# Patient Record
Sex: FEMALE | Race: WHITE
[De-identification: names, ages, dates, MRNs, and addresses within clinical notes are randomized per-mention and may not be internally consistent; named-entity substitution may affect disease eponyms.]

---

## 2017-06-25 ENCOUNTER — HOSPITAL ENCOUNTER (INPATIENT)
Dept: HOSPITAL 8 - LDOP | Age: 22
LOS: 3 days | Discharge: HOME | DRG: 781 | End: 2017-06-28
Attending: OBSTETRICS & GYNECOLOGY | Admitting: OBSTETRICS & GYNECOLOGY
Payer: COMMERCIAL

## 2017-06-25 VITALS — BODY MASS INDEX: 33.71 KG/M2 | HEIGHT: 63 IN | WEIGHT: 190.26 LBS

## 2017-06-25 VITALS — SYSTOLIC BLOOD PRESSURE: 156 MMHG | DIASTOLIC BLOOD PRESSURE: 114 MMHG

## 2017-06-25 DIAGNOSIS — Z67.41: ICD-10-CM

## 2017-06-25 DIAGNOSIS — O26.873: ICD-10-CM

## 2017-06-25 DIAGNOSIS — O26.893: ICD-10-CM

## 2017-06-25 DIAGNOSIS — O99.333: ICD-10-CM

## 2017-06-25 DIAGNOSIS — O99.613: ICD-10-CM

## 2017-06-25 DIAGNOSIS — O14.93: Primary | ICD-10-CM

## 2017-06-25 DIAGNOSIS — F17.210: ICD-10-CM

## 2017-06-25 DIAGNOSIS — K21.9: ICD-10-CM

## 2017-06-25 DIAGNOSIS — Z87.59: ICD-10-CM

## 2017-06-25 LAB — DAU SCREEN: (no result)

## 2017-06-25 PROCEDURE — 36415 COLL VENOUS BLD VENIPUNCTURE: CPT

## 2017-06-25 PROCEDURE — 83735 ASSAY OF MAGNESIUM: CPT

## 2017-06-25 PROCEDURE — 84156 ASSAY OF PROTEIN URINE: CPT

## 2017-06-25 PROCEDURE — 86900 BLOOD TYPING SEROLOGIC ABO: CPT

## 2017-06-25 PROCEDURE — 81001 URINALYSIS AUTO W/SCOPE: CPT

## 2017-06-25 PROCEDURE — S0028 INJECTION, FAMOTIDINE, 20 MG: HCPCS

## 2017-06-25 PROCEDURE — 85025 COMPLETE CBC W/AUTO DIFF WBC: CPT

## 2017-06-25 PROCEDURE — 82248 BILIRUBIN DIRECT: CPT

## 2017-06-25 PROCEDURE — 82570 ASSAY OF URINE CREATININE: CPT

## 2017-06-25 PROCEDURE — 80053 COMPREHEN METABOLIC PANEL: CPT

## 2017-06-25 PROCEDURE — 80307 DRUG TEST PRSMV CHEM ANLYZR: CPT

## 2017-06-25 PROCEDURE — 87081 CULTURE SCREEN ONLY: CPT

## 2017-06-25 PROCEDURE — 86850 RBC ANTIBODY SCREEN: CPT

## 2017-06-25 PROCEDURE — 84550 ASSAY OF BLOOD/URIC ACID: CPT

## 2017-06-25 PROCEDURE — 81050 URINALYSIS VOLUME MEASURE: CPT

## 2017-06-25 RX ADMIN — BETAMETHASONE SODIUM PHOSPHATE AND BETAMETHASONE ACETATE SCH MG: 3; 3 INJECTION, SUSPENSION INTRA-ARTICULAR; INTRALESIONAL; INTRAMUSCULAR at 23:31

## 2017-06-25 RX ADMIN — SODIUM CHLORIDE, SODIUM LACTATE, POTASSIUM CHLORIDE, AND CALCIUM CHLORIDE PRN MLS/HR: .6; .31; .03; .02 INJECTION, SOLUTION INTRAVENOUS at 23:22

## 2017-06-25 RX ADMIN — MAGNESIUM SULFATE HEPTAHYDRATE SCH MLS/HR: 40 INJECTION, SOLUTION INTRAVENOUS at 23:50

## 2017-06-26 VITALS — DIASTOLIC BLOOD PRESSURE: 73 MMHG | SYSTOLIC BLOOD PRESSURE: 122 MMHG

## 2017-06-26 VITALS — SYSTOLIC BLOOD PRESSURE: 131 MMHG | DIASTOLIC BLOOD PRESSURE: 83 MMHG

## 2017-06-26 LAB
AST SERPL-CCNC: 14 U/L (ref 15–37)
BUN SERPL-MCNC: 4 MG/DL (ref 7–18)

## 2017-06-26 RX ADMIN — MAGNESIUM SULFATE HEPTAHYDRATE SCH MLS/HR: 40 INJECTION, SOLUTION INTRAVENOUS at 10:56

## 2017-06-26 RX ADMIN — AMPICILLIN SODIUM SCH MLS/HR: 1 INJECTION, POWDER, FOR SOLUTION INTRAMUSCULAR; INTRAVENOUS at 20:17

## 2017-06-26 RX ADMIN — MAGNESIUM SULFATE HEPTAHYDRATE SCH MLS/HR: 40 INJECTION, SOLUTION INTRAVENOUS at 22:45

## 2017-06-26 RX ADMIN — SODIUM CHLORIDE, SODIUM LACTATE, POTASSIUM CHLORIDE, AND CALCIUM CHLORIDE PRN MLS/HR: .6; .31; .03; .02 INJECTION, SOLUTION INTRAVENOUS at 08:01

## 2017-06-26 RX ADMIN — ACETAMINOPHEN PRN MG: 325 TABLET, FILM COATED ORAL at 22:34

## 2017-06-26 RX ADMIN — ACETAMINOPHEN PRN MG: 325 TABLET, FILM COATED ORAL at 06:56

## 2017-06-26 RX ADMIN — AMPICILLIN SODIUM SCH MLS/HR: 1 INJECTION, POWDER, FOR SOLUTION INTRAMUSCULAR; INTRAVENOUS at 12:00

## 2017-06-26 RX ADMIN — AMPICILLIN SODIUM SCH MLS/HR: 1 INJECTION, POWDER, FOR SOLUTION INTRAMUSCULAR; INTRAVENOUS at 08:07

## 2017-06-26 RX ADMIN — FAMOTIDINE PRN MG: 10 INJECTION INTRAVENOUS at 22:35

## 2017-06-26 RX ADMIN — AMPICILLIN SODIUM SCH MLS/HR: 1 INJECTION, POWDER, FOR SOLUTION INTRAMUSCULAR; INTRAVENOUS at 16:03

## 2017-06-26 RX ADMIN — BETAMETHASONE SODIUM PHOSPHATE AND BETAMETHASONE ACETATE SCH MG: 3; 3 INJECTION, SUSPENSION INTRA-ARTICULAR; INTRALESIONAL; INTRAMUSCULAR at 23:48

## 2017-06-26 RX ADMIN — AMPICILLIN SODIUM SCH MLS/HR: 1 INJECTION, POWDER, FOR SOLUTION INTRAMUSCULAR; INTRAVENOUS at 04:00

## 2017-06-26 RX ADMIN — SODIUM CHLORIDE, SODIUM LACTATE, POTASSIUM CHLORIDE, AND CALCIUM CHLORIDE PRN MLS/HR: .6; .31; .03; .02 INJECTION, SOLUTION INTRAVENOUS at 22:37

## 2017-06-26 RX ADMIN — FAMOTIDINE PRN MG: 10 INJECTION INTRAVENOUS at 00:15

## 2017-06-27 VITALS — SYSTOLIC BLOOD PRESSURE: 127 MMHG | DIASTOLIC BLOOD PRESSURE: 67 MMHG

## 2017-06-27 VITALS — SYSTOLIC BLOOD PRESSURE: 134 MMHG | DIASTOLIC BLOOD PRESSURE: 89 MMHG

## 2017-06-27 LAB
AST SERPL-CCNC: 12 U/L (ref 15–37)
BUN SERPL-MCNC: 5 MG/DL (ref 7–18)

## 2017-06-27 RX ADMIN — AMPICILLIN SODIUM SCH MLS/HR: 1 INJECTION, POWDER, FOR SOLUTION INTRAMUSCULAR; INTRAVENOUS at 04:17

## 2017-06-27 RX ADMIN — AMPICILLIN SODIUM SCH MLS/HR: 1 INJECTION, POWDER, FOR SOLUTION INTRAMUSCULAR; INTRAVENOUS at 11:50

## 2017-06-27 RX ADMIN — FAMOTIDINE PRN MG: 10 INJECTION INTRAVENOUS at 22:27

## 2017-06-27 RX ADMIN — AMPICILLIN SODIUM SCH MLS/HR: 1 INJECTION, POWDER, FOR SOLUTION INTRAMUSCULAR; INTRAVENOUS at 17:00

## 2017-06-27 RX ADMIN — AMPICILLIN SODIUM SCH MLS/HR: 1 INJECTION, POWDER, FOR SOLUTION INTRAMUSCULAR; INTRAVENOUS at 00:47

## 2017-06-27 RX ADMIN — AMPICILLIN SODIUM SCH MLS/HR: 1 INJECTION, POWDER, FOR SOLUTION INTRAMUSCULAR; INTRAVENOUS at 07:42

## 2017-06-28 VITALS — SYSTOLIC BLOOD PRESSURE: 140 MMHG | DIASTOLIC BLOOD PRESSURE: 77 MMHG

## 2017-06-29 ENCOUNTER — HOSPITAL ENCOUNTER (INPATIENT)
Dept: HOSPITAL 8 - LDOP | Age: 22
LOS: 3 days | Discharge: HOME | End: 2017-07-02
Attending: OBSTETRICS & GYNECOLOGY | Admitting: OBSTETRICS & GYNECOLOGY
Payer: COMMERCIAL

## 2017-06-29 VITALS — BODY MASS INDEX: 23.98 KG/M2 | HEIGHT: 63 IN | WEIGHT: 135.36 LBS

## 2017-06-29 VITALS — DIASTOLIC BLOOD PRESSURE: 75 MMHG | SYSTOLIC BLOOD PRESSURE: 142 MMHG

## 2017-06-29 DIAGNOSIS — O26.893: ICD-10-CM

## 2017-06-29 DIAGNOSIS — O26.899: ICD-10-CM

## 2017-06-29 DIAGNOSIS — Z79.899: ICD-10-CM

## 2017-06-29 DIAGNOSIS — Z23: ICD-10-CM

## 2017-06-29 DIAGNOSIS — D64.9: ICD-10-CM

## 2017-06-29 DIAGNOSIS — K21.9: ICD-10-CM

## 2017-06-29 DIAGNOSIS — O26.873: ICD-10-CM

## 2017-06-29 DIAGNOSIS — Z67.11: ICD-10-CM

## 2017-06-29 DIAGNOSIS — Z87.59: ICD-10-CM

## 2017-06-29 DIAGNOSIS — Z3A.36: ICD-10-CM

## 2017-06-29 DIAGNOSIS — F17.210: ICD-10-CM

## 2017-06-29 DIAGNOSIS — Z87.440: ICD-10-CM

## 2017-06-29 LAB
AST SERPL-CCNC: 47 U/L (ref 15–37)
BUN SERPL-MCNC: 7 MG/DL (ref 7–18)
DAU SCREEN: (no result)

## 2017-06-29 PROCEDURE — 00HU33Z INSERTION OF INFUSION DEVICE INTO SPINAL CANAL, PERCUTANEOUS APPROACH: ICD-10-PCS | Performed by: OBSTETRICS & GYNECOLOGY

## 2017-06-29 PROCEDURE — 3E0R3CZ: ICD-10-PCS | Performed by: OBSTETRICS & GYNECOLOGY

## 2017-06-29 PROCEDURE — 81001 URINALYSIS AUTO W/SCOPE: CPT

## 2017-06-29 PROCEDURE — 87086 URINE CULTURE/COLONY COUNT: CPT

## 2017-06-29 PROCEDURE — 85025 COMPLETE CBC W/AUTO DIFF WBC: CPT

## 2017-06-29 PROCEDURE — 85461 HEMOGLOBIN FETAL: CPT

## 2017-06-29 PROCEDURE — 90715 TDAP VACCINE 7 YRS/> IM: CPT

## 2017-06-29 PROCEDURE — 84156 ASSAY OF PROTEIN URINE: CPT

## 2017-06-29 PROCEDURE — 80053 COMPREHEN METABOLIC PANEL: CPT

## 2017-06-29 PROCEDURE — 83735 ASSAY OF MAGNESIUM: CPT

## 2017-06-29 PROCEDURE — 10907ZC DRAINAGE OF AMNIOTIC FLUID, THERAPEUTIC FROM PRODUCTS OF CONCEPTION, VIA NATURAL OR ARTIFICIAL OPENING: ICD-10-PCS | Performed by: OBSTETRICS & GYNECOLOGY

## 2017-06-29 PROCEDURE — 36415 COLL VENOUS BLD VENIPUNCTURE: CPT

## 2017-06-29 PROCEDURE — 86900 BLOOD TYPING SEROLOGIC ABO: CPT

## 2017-06-29 PROCEDURE — 82570 ASSAY OF URINE CREATININE: CPT

## 2017-06-29 PROCEDURE — 84550 ASSAY OF BLOOD/URIC ACID: CPT

## 2017-06-29 PROCEDURE — 82248 BILIRUBIN DIRECT: CPT

## 2017-06-29 PROCEDURE — 0HQ9XZZ REPAIR PERINEUM SKIN, EXTERNAL APPROACH: ICD-10-PCS | Performed by: OBSTETRICS & GYNECOLOGY

## 2017-06-29 PROCEDURE — 80307 DRUG TEST PRSMV CHEM ANLYZR: CPT

## 2017-06-29 PROCEDURE — 86850 RBC ANTIBODY SCREEN: CPT

## 2017-06-29 RX ADMIN — SODIUM CHLORIDE, SODIUM LACTATE, POTASSIUM CHLORIDE, AND CALCIUM CHLORIDE SCH MLS/HR: .6; .31; .03; .02 INJECTION, SOLUTION INTRAVENOUS at 17:02

## 2017-06-29 RX ADMIN — SODIUM CHLORIDE, SODIUM LACTATE, POTASSIUM CHLORIDE, CALCIUM CHLORIDE, AND DEXTROSE MONOHYDRATE SCH MLS/HR: 600; 310; 30; 20; 5 INJECTION, SOLUTION INTRAVENOUS at 19:49

## 2017-06-29 RX ADMIN — MAGNESIUM SULFATE HEPTAHYDRATE SCH MLS/HR: 40 INJECTION, SOLUTION INTRAVENOUS at 13:15

## 2017-06-29 RX ADMIN — SODIUM CHLORIDE, SODIUM LACTATE, POTASSIUM CHLORIDE, AND CALCIUM CHLORIDE SCH MLS/HR: .6; .31; .03; .02 INJECTION, SOLUTION INTRAVENOUS at 12:27

## 2017-06-29 RX ADMIN — SODIUM CHLORIDE, SODIUM LACTATE, POTASSIUM CHLORIDE, CALCIUM CHLORIDE, AND DEXTROSE MONOHYDRATE SCH MLS/HR: 600; 310; 30; 20; 5 INJECTION, SOLUTION INTRAVENOUS at 11:49

## 2017-06-29 RX ADMIN — SODIUM CHLORIDE, SODIUM LACTATE, POTASSIUM CHLORIDE, AND CALCIUM CHLORIDE SCH MLS/HR: .6; .31; .03; .02 INJECTION, SOLUTION INTRAVENOUS at 16:48

## 2017-06-29 RX ADMIN — MAGNESIUM SULFATE HEPTAHYDRATE SCH MLS/HR: 40 INJECTION, SOLUTION INTRAVENOUS at 23:40

## 2017-06-29 RX ADMIN — AMPICILLIN SODIUM SCH MLS/HR: 1 INJECTION, POWDER, FOR SOLUTION INTRAMUSCULAR; INTRAVENOUS at 16:00

## 2017-06-29 RX ADMIN — AMPICILLIN SODIUM SCH MLS/HR: 1 INJECTION, POWDER, FOR SOLUTION INTRAMUSCULAR; INTRAVENOUS at 20:00

## 2017-06-30 LAB
AST SERPL-CCNC: 35 U/L (ref 15–37)
BUN SERPL-MCNC: 9 MG/DL (ref 7–18)

## 2017-06-30 RX ADMIN — AMPICILLIN SODIUM SCH MLS/HR: 1 INJECTION, POWDER, FOR SOLUTION INTRAMUSCULAR; INTRAVENOUS at 08:00

## 2017-06-30 RX ADMIN — HYDROCODONE BITARTRATE AND ACETAMINOPHEN PRN TAB: 5; 325 TABLET ORAL at 23:48

## 2017-06-30 RX ADMIN — SODIUM CHLORIDE, SODIUM LACTATE, POTASSIUM CHLORIDE, CALCIUM CHLORIDE, AND DEXTROSE MONOHYDRATE SCH MLS/HR: 600; 310; 30; 20; 5 INJECTION, SOLUTION INTRAVENOUS at 03:49

## 2017-06-30 RX ADMIN — AMPICILLIN SODIUM SCH MLS/HR: 1 INJECTION, POWDER, FOR SOLUTION INTRAMUSCULAR; INTRAVENOUS at 12:00

## 2017-06-30 RX ADMIN — LABETALOL HYDROCHLORIDE PRN MG: 5 INJECTION INTRAVENOUS at 06:24

## 2017-06-30 RX ADMIN — SODIUM CHLORIDE, SODIUM LACTATE, POTASSIUM CHLORIDE, CALCIUM CHLORIDE, AND DEXTROSE MONOHYDRATE SCH MLS/HR: 600; 310; 30; 20; 5 INJECTION, SOLUTION INTRAVENOUS at 19:49

## 2017-06-30 RX ADMIN — MAGNESIUM SULFATE HEPTAHYDRATE SCH MLS/HR: 40 INJECTION, SOLUTION INTRAVENOUS at 19:05

## 2017-06-30 RX ADMIN — SODIUM CHLORIDE, SODIUM LACTATE, POTASSIUM CHLORIDE, CALCIUM CHLORIDE, AND DEXTROSE MONOHYDRATE SCH MLS/HR: 600; 310; 30; 20; 5 INJECTION, SOLUTION INTRAVENOUS at 11:49

## 2017-06-30 RX ADMIN — SODIUM CHLORIDE, SODIUM LACTATE, POTASSIUM CHLORIDE, AND CALCIUM CHLORIDE SCH MLS/HR: .6; .31; .03; .02 INJECTION, SOLUTION INTRAVENOUS at 11:49

## 2017-06-30 RX ADMIN — AMPICILLIN SODIUM SCH MLS/HR: 1 INJECTION, POWDER, FOR SOLUTION INTRAMUSCULAR; INTRAVENOUS at 00:00

## 2017-06-30 RX ADMIN — LABETALOL HYDROCHLORIDE SCH MG: 200 TABLET, FILM COATED ORAL at 10:52

## 2017-06-30 RX ADMIN — Medication SCH MLS/HR: at 06:08

## 2017-06-30 RX ADMIN — SODIUM CHLORIDE, SODIUM LACTATE, POTASSIUM CHLORIDE, AND CALCIUM CHLORIDE SCH MLS/HR: .6; .31; .03; .02 INJECTION, SOLUTION INTRAVENOUS at 17:02

## 2017-06-30 RX ADMIN — LABETALOL HYDROCHLORIDE SCH MG: 200 TABLET, FILM COATED ORAL at 19:02

## 2017-06-30 RX ADMIN — SODIUM CHLORIDE, SODIUM LACTATE, POTASSIUM CHLORIDE, AND CALCIUM CHLORIDE SCH MLS/HR: .6; .31; .03; .02 INJECTION, SOLUTION INTRAVENOUS at 09:02

## 2017-06-30 RX ADMIN — LABETALOL HYDROCHLORIDE PRN MG: 5 INJECTION INTRAVENOUS at 04:41

## 2017-06-30 RX ADMIN — SODIUM CHLORIDE, SODIUM LACTATE, POTASSIUM CHLORIDE, AND CALCIUM CHLORIDE SCH MLS/HR: .6; .31; .03; .02 INJECTION, SOLUTION INTRAVENOUS at 03:40

## 2017-06-30 RX ADMIN — AMPICILLIN SODIUM SCH MLS/HR: 1 INJECTION, POWDER, FOR SOLUTION INTRAMUSCULAR; INTRAVENOUS at 20:00

## 2017-06-30 RX ADMIN — SODIUM CHLORIDE, SODIUM LACTATE, POTASSIUM CHLORIDE, AND CALCIUM CHLORIDE SCH MLS/HR: .6; .31; .03; .02 INJECTION, SOLUTION INTRAVENOUS at 19:49

## 2017-06-30 RX ADMIN — AMPICILLIN SODIUM SCH MLS/HR: 1 INJECTION, POWDER, FOR SOLUTION INTRAMUSCULAR; INTRAVENOUS at 04:00

## 2017-06-30 RX ADMIN — LABETALOL HYDROCHLORIDE PRN MG: 5 INJECTION INTRAVENOUS at 08:31

## 2017-06-30 RX ADMIN — SODIUM CHLORIDE, SODIUM LACTATE, POTASSIUM CHLORIDE, AND CALCIUM CHLORIDE SCH MLS/HR: .6; .31; .03; .02 INJECTION, SOLUTION INTRAVENOUS at 01:02

## 2017-06-30 RX ADMIN — AMPICILLIN SODIUM SCH MLS/HR: 1 INJECTION, POWDER, FOR SOLUTION INTRAMUSCULAR; INTRAVENOUS at 16:00

## 2017-06-30 RX ADMIN — HYDROCODONE BITARTRATE AND ACETAMINOPHEN PRN TAB: 5; 325 TABLET ORAL at 19:16

## 2017-06-30 RX ADMIN — Medication SCH MLS/HR: at 16:08

## 2017-06-30 RX ADMIN — HYDROCODONE BITARTRATE AND ACETAMINOPHEN PRN TAB: 5; 325 TABLET ORAL at 15:02

## 2017-06-30 RX ADMIN — HYDROCODONE BITARTRATE AND ACETAMINOPHEN PRN TAB: 5; 325 TABLET ORAL at 10:52

## 2017-06-30 RX ADMIN — MAGNESIUM SULFATE HEPTAHYDRATE SCH MLS/HR: 40 INJECTION, SOLUTION INTRAVENOUS at 09:04

## 2017-07-01 VITALS — SYSTOLIC BLOOD PRESSURE: 154 MMHG | DIASTOLIC BLOOD PRESSURE: 96 MMHG

## 2017-07-01 VITALS — SYSTOLIC BLOOD PRESSURE: 142 MMHG | DIASTOLIC BLOOD PRESSURE: 96 MMHG

## 2017-07-01 VITALS — SYSTOLIC BLOOD PRESSURE: 157 MMHG | DIASTOLIC BLOOD PRESSURE: 117 MMHG

## 2017-07-01 VITALS — SYSTOLIC BLOOD PRESSURE: 146 MMHG | DIASTOLIC BLOOD PRESSURE: 102 MMHG

## 2017-07-01 VITALS — DIASTOLIC BLOOD PRESSURE: 98 MMHG | SYSTOLIC BLOOD PRESSURE: 153 MMHG

## 2017-07-01 VITALS — DIASTOLIC BLOOD PRESSURE: 109 MMHG | SYSTOLIC BLOOD PRESSURE: 163 MMHG

## 2017-07-01 PROCEDURE — 3E0334Z INTRODUCTION OF SERUM, TOXOID AND VACCINE INTO PERIPHERAL VEIN, PERCUTANEOUS APPROACH: ICD-10-PCS | Performed by: OBSTETRICS & GYNECOLOGY

## 2017-07-01 RX ADMIN — MAGNESIUM SULFATE HEPTAHYDRATE SCH MLS/HR: 40 INJECTION, SOLUTION INTRAVENOUS at 04:42

## 2017-07-01 RX ADMIN — AMPICILLIN SODIUM SCH MLS/HR: 1 INJECTION, POWDER, FOR SOLUTION INTRAMUSCULAR; INTRAVENOUS at 04:00

## 2017-07-01 RX ADMIN — FERROUS SULFATE TAB 325 MG (65 MG ELEMENTAL FE) SCH MG: 325 (65 FE) TAB at 08:37

## 2017-07-01 RX ADMIN — DOCUSATE SODIUM SCH MG: 100 CAPSULE, LIQUID FILLED ORAL at 20:51

## 2017-07-01 RX ADMIN — PRENATAL VIT W/ FE FUMARATE-FA TAB 27-0.8 MG SCH EACH: 27-0.8 TAB at 08:37

## 2017-07-01 RX ADMIN — SODIUM CHLORIDE, SODIUM LACTATE, POTASSIUM CHLORIDE, CALCIUM CHLORIDE, AND DEXTROSE MONOHYDRATE SCH MLS/HR: 600; 310; 30; 20; 5 INJECTION, SOLUTION INTRAVENOUS at 03:49

## 2017-07-01 RX ADMIN — AMPICILLIN SODIUM SCH MLS/HR: 1 INJECTION, POWDER, FOR SOLUTION INTRAMUSCULAR; INTRAVENOUS at 00:00

## 2017-07-01 RX ADMIN — HYDROCODONE BITARTRATE AND ACETAMINOPHEN PRN TAB: 5; 325 TABLET ORAL at 13:09

## 2017-07-01 RX ADMIN — SODIUM CHLORIDE, SODIUM LACTATE, POTASSIUM CHLORIDE, AND CALCIUM CHLORIDE SCH MLS/HR: .6; .31; .03; .02 INJECTION, SOLUTION INTRAVENOUS at 03:49

## 2017-07-01 RX ADMIN — SODIUM CHLORIDE, SODIUM LACTATE, POTASSIUM CHLORIDE, AND CALCIUM CHLORIDE SCH MLS/HR: .6; .31; .03; .02 INJECTION, SOLUTION INTRAVENOUS at 01:02

## 2017-07-01 RX ADMIN — HYDROCODONE BITARTRATE AND ACETAMINOPHEN PRN TAB: 5; 325 TABLET ORAL at 08:36

## 2017-07-01 RX ADMIN — LABETALOL HYDROCHLORIDE SCH MG: 200 TABLET, FILM COATED ORAL at 16:28

## 2017-07-01 RX ADMIN — FERROUS SULFATE TAB 325 MG (65 MG ELEMENTAL FE) SCH MG: 325 (65 FE) TAB at 16:28

## 2017-07-01 RX ADMIN — LABETALOL HYDROCHLORIDE SCH MG: 200 TABLET, FILM COATED ORAL at 20:51

## 2017-07-01 RX ADMIN — LABETALOL HYDROCHLORIDE SCH MG: 200 TABLET, FILM COATED ORAL at 08:36

## 2017-07-01 RX ADMIN — PRENATAL VIT W/ FE FUMARATE-FA TAB 27-0.8 MG SCH EACH: 27-0.8 TAB at 09:00

## 2017-07-01 RX ADMIN — HYDROCODONE BITARTRATE AND ACETAMINOPHEN PRN TAB: 5; 325 TABLET ORAL at 19:31

## 2017-07-01 RX ADMIN — Medication SCH MLS/HR: at 02:08

## 2017-07-02 VITALS — DIASTOLIC BLOOD PRESSURE: 77 MMHG | SYSTOLIC BLOOD PRESSURE: 156 MMHG

## 2017-07-02 VITALS — DIASTOLIC BLOOD PRESSURE: 93 MMHG | SYSTOLIC BLOOD PRESSURE: 135 MMHG

## 2017-07-02 VITALS — DIASTOLIC BLOOD PRESSURE: 103 MMHG | SYSTOLIC BLOOD PRESSURE: 164 MMHG

## 2017-07-02 VITALS — DIASTOLIC BLOOD PRESSURE: 90 MMHG | SYSTOLIC BLOOD PRESSURE: 155 MMHG

## 2017-07-02 VITALS — DIASTOLIC BLOOD PRESSURE: 102 MMHG | SYSTOLIC BLOOD PRESSURE: 149 MMHG

## 2017-07-02 VITALS — DIASTOLIC BLOOD PRESSURE: 78 MMHG | SYSTOLIC BLOOD PRESSURE: 133 MMHG

## 2017-07-02 VITALS — SYSTOLIC BLOOD PRESSURE: 159 MMHG | DIASTOLIC BLOOD PRESSURE: 107 MMHG

## 2017-07-02 RX ADMIN — FERROUS SULFATE TAB 325 MG (65 MG ELEMENTAL FE) SCH MG: 325 (65 FE) TAB at 09:21

## 2017-07-02 RX ADMIN — LABETALOL HYDROCHLORIDE SCH MG: 200 TABLET, FILM COATED ORAL at 15:36

## 2017-07-02 RX ADMIN — PRENATAL VIT W/ FE FUMARATE-FA TAB 27-0.8 MG SCH EACH: 27-0.8 TAB at 09:21

## 2017-07-02 RX ADMIN — LABETALOL HYDROCHLORIDE SCH MG: 200 TABLET, FILM COATED ORAL at 04:46

## 2017-07-02 RX ADMIN — HYDROCODONE BITARTRATE AND ACETAMINOPHEN PRN TAB: 5; 325 TABLET ORAL at 04:28

## 2017-07-02 RX ADMIN — DOCUSATE SODIUM SCH MG: 100 CAPSULE, LIQUID FILLED ORAL at 09:21

## 2019-12-20 ENCOUNTER — HOSPITAL ENCOUNTER (INPATIENT)
Facility: MEDICAL CENTER | Age: 24
LOS: 2 days | DRG: 831 | End: 2019-12-22
Attending: EMERGENCY MEDICINE | Admitting: INTERNAL MEDICINE

## 2019-12-20 ENCOUNTER — INITIAL PRENATAL (OUTPATIENT)
Dept: OBGYN | Facility: CLINIC | Age: 24
End: 2019-12-20

## 2019-12-20 VITALS
BODY MASS INDEX: 21.74 KG/M2 | SYSTOLIC BLOOD PRESSURE: 146 MMHG | WEIGHT: 122.7 LBS | HEIGHT: 63 IN | DIASTOLIC BLOOD PRESSURE: 86 MMHG

## 2019-12-20 DIAGNOSIS — R30.0 DYSURIA: ICD-10-CM

## 2019-12-20 DIAGNOSIS — N12 PYELONEPHRITIS: ICD-10-CM

## 2019-12-20 DIAGNOSIS — Z3A.12 12 WEEKS GESTATION OF PREGNANCY: ICD-10-CM

## 2019-12-20 DIAGNOSIS — Z32.01 PREGNANCY EXAMINATION OR TEST, POSITIVE RESULT: ICD-10-CM

## 2019-12-20 DIAGNOSIS — F15.10 METHAMPHETAMINE ABUSE (HCC): ICD-10-CM

## 2019-12-20 PROBLEM — N10 ACUTE PYELONEPHRITIS: Status: ACTIVE | Noted: 2019-12-20

## 2019-12-20 PROBLEM — A41.9 SEPSIS (HCC): Status: ACTIVE | Noted: 2019-12-20

## 2019-12-20 PROBLEM — D72.829 LEUKOCYTOSIS: Status: ACTIVE | Noted: 2019-12-20

## 2019-12-20 LAB
ALBUMIN SERPL BCP-MCNC: 4 G/DL (ref 3.2–4.9)
ALBUMIN/GLOB SERPL: 1.4 G/DL
ALP SERPL-CCNC: 63 U/L (ref 30–99)
ALT SERPL-CCNC: 9 U/L (ref 2–50)
AMPHET UR QL SCN: POSITIVE
ANION GAP SERPL CALC-SCNC: 9 MMOL/L (ref 0–11.9)
APPEARANCE UR: ABNORMAL
APPEARANCE UR: NORMAL
AST SERPL-CCNC: 10 U/L (ref 12–45)
B-HCG SERPL-ACNC: ABNORMAL MIU/ML (ref 0–5)
BACTERIA #/AREA URNS HPF: ABNORMAL /HPF
BARBITURATES UR QL SCN: NEGATIVE
BASOPHILS # BLD AUTO: 0.2 % (ref 0–1.8)
BASOPHILS # BLD: 0.03 K/UL (ref 0–0.12)
BENZODIAZ UR QL SCN: NEGATIVE
BILIRUB SERPL-MCNC: 0.7 MG/DL (ref 0.1–1.5)
BILIRUB UR QL STRIP.AUTO: NEGATIVE
BILIRUB UR STRIP-MCNC: NORMAL MG/DL
BUN SERPL-MCNC: 7 MG/DL (ref 8–22)
BZE UR QL SCN: NEGATIVE
CALCIUM SERPL-MCNC: 9.1 MG/DL (ref 8.5–10.5)
CANNABINOIDS UR QL SCN: NEGATIVE
CHLORIDE SERPL-SCNC: 105 MMOL/L (ref 96–112)
CO2 SERPL-SCNC: 20 MMOL/L (ref 20–33)
COLOR UR AUTO: NORMAL
COLOR UR: YELLOW
CREAT SERPL-MCNC: 0.58 MG/DL (ref 0.5–1.4)
EOSINOPHIL # BLD AUTO: 0.01 K/UL (ref 0–0.51)
EOSINOPHIL NFR BLD: 0.1 % (ref 0–6.9)
EPI CELLS #/AREA URNS HPF: ABNORMAL /HPF
ERYTHROCYTE [DISTWIDTH] IN BLOOD BY AUTOMATED COUNT: 40.3 FL (ref 35.9–50)
GLOBULIN SER CALC-MCNC: 2.9 G/DL (ref 1.9–3.5)
GLUCOSE SERPL-MCNC: 104 MG/DL (ref 65–99)
GLUCOSE UR STRIP.AUTO-MCNC: NEGATIVE MG/DL
GLUCOSE UR STRIP.AUTO-MCNC: NEGATIVE MG/DL
HCT VFR BLD AUTO: 40.7 % (ref 37–47)
HGB BLD-MCNC: 14.5 G/DL (ref 12–16)
HYALINE CASTS #/AREA URNS LPF: ABNORMAL /LPF
IMM GRANULOCYTES # BLD AUTO: 0.12 K/UL (ref 0–0.11)
IMM GRANULOCYTES NFR BLD AUTO: 0.6 % (ref 0–0.9)
INR PPP: 1 (ref 0.87–1.13)
INT CON NEG: NEGATIVE
INT CON POS: POSITIVE
KETONES UR STRIP.AUTO-MCNC: NEGATIVE MG/DL
KETONES UR STRIP.AUTO-MCNC: NEGATIVE MG/DL
LACTATE BLD-SCNC: 1.6 MMOL/L (ref 0.5–2)
LEUKOCYTE ESTERASE UR QL STRIP.AUTO: ABNORMAL
LEUKOCYTE ESTERASE UR QL STRIP.AUTO: NORMAL
LIPASE SERPL-CCNC: 7 U/L (ref 11–82)
LYMPHOCYTES # BLD AUTO: 0.89 K/UL (ref 1–4.8)
LYMPHOCYTES NFR BLD: 4.7 % (ref 22–41)
MCH RBC QN AUTO: 33.1 PG (ref 27–33)
MCHC RBC AUTO-ENTMCNC: 35.6 G/DL (ref 33.6–35)
MCV RBC AUTO: 92.9 FL (ref 81.4–97.8)
METHADONE UR QL SCN: NEGATIVE
MICRO URNS: ABNORMAL
MONOCYTES # BLD AUTO: 0.58 K/UL (ref 0–0.85)
MONOCYTES NFR BLD AUTO: 3 % (ref 0–13.4)
NEUTROPHILS # BLD AUTO: 17.42 K/UL (ref 2–7.15)
NEUTROPHILS NFR BLD: 91.4 % (ref 44–72)
NITRITE UR QL STRIP.AUTO: POSITIVE
NITRITE UR QL STRIP.AUTO: POSITIVE
NRBC # BLD AUTO: 0 K/UL
NRBC BLD-RTO: 0 /100 WBC
OPIATES UR QL SCN: NEGATIVE
OXYCODONE UR QL SCN: NEGATIVE
PCP UR QL SCN: NEGATIVE
PH UR STRIP.AUTO: 7 [PH] (ref 5–8)
PH UR STRIP.AUTO: 8 [PH] (ref 5–8)
PLATELET # BLD AUTO: 313 K/UL (ref 164–446)
PMV BLD AUTO: 8.8 FL (ref 9–12.9)
POC URINE PREGNANCY TEST: POSITIVE
POTASSIUM SERPL-SCNC: 3.7 MMOL/L (ref 3.6–5.5)
PROPOXYPH UR QL SCN: NEGATIVE
PROT SERPL-MCNC: 6.9 G/DL (ref 6–8.2)
PROT UR QL STRIP: 100 MG/DL
PROT UR QL STRIP: NORMAL MG/DL
PROTHROMBIN TIME: 13.4 SEC (ref 12–14.6)
RBC # BLD AUTO: 4.38 M/UL (ref 4.2–5.4)
RBC # URNS HPF: ABNORMAL /HPF
RBC UR QL AUTO: ABNORMAL
RBC UR QL AUTO: NORMAL
SODIUM SERPL-SCNC: 134 MMOL/L (ref 135–145)
SP GR UR STRIP.AUTO: 1.02
SP GR UR STRIP.AUTO: 1.02
UROBILINOGEN UR STRIP-MCNC: NORMAL MG/DL
UROBILINOGEN UR STRIP.AUTO-MCNC: 1 MG/DL
WBC # BLD AUTO: 19.1 K/UL (ref 4.8–10.8)
WBC #/AREA URNS HPF: ABNORMAL /HPF

## 2019-12-20 PROCEDURE — 81002 URINALYSIS NONAUTO W/O SCOPE: CPT | Performed by: OBSTETRICS & GYNECOLOGY

## 2019-12-20 PROCEDURE — 700111 HCHG RX REV CODE 636 W/ 250 OVERRIDE (IP): Performed by: EMERGENCY MEDICINE

## 2019-12-20 PROCEDURE — 700105 HCHG RX REV CODE 258: Performed by: INTERNAL MEDICINE

## 2019-12-20 PROCEDURE — 99223 1ST HOSP IP/OBS HIGH 75: CPT | Performed by: INTERNAL MEDICINE

## 2019-12-20 PROCEDURE — 700105 HCHG RX REV CODE 258: Performed by: EMERGENCY MEDICINE

## 2019-12-20 PROCEDURE — 80053 COMPREHEN METABOLIC PANEL: CPT

## 2019-12-20 PROCEDURE — 83690 ASSAY OF LIPASE: CPT

## 2019-12-20 PROCEDURE — 81001 URINALYSIS AUTO W/SCOPE: CPT

## 2019-12-20 PROCEDURE — 700102 HCHG RX REV CODE 250 W/ 637 OVERRIDE(OP): Performed by: INTERNAL MEDICINE

## 2019-12-20 PROCEDURE — 87040 BLOOD CULTURE FOR BACTERIA: CPT

## 2019-12-20 PROCEDURE — 99202 OFFICE O/P NEW SF 15 MIN: CPT | Mod: 25 | Performed by: OBSTETRICS & GYNECOLOGY

## 2019-12-20 PROCEDURE — 76830 TRANSVAGINAL US NON-OB: CPT | Performed by: OBSTETRICS & GYNECOLOGY

## 2019-12-20 PROCEDURE — 770020 HCHG ROOM/CARE - TELE (206)

## 2019-12-20 PROCEDURE — 87086 URINE CULTURE/COLONY COUNT: CPT

## 2019-12-20 PROCEDURE — A9270 NON-COVERED ITEM OR SERVICE: HCPCS | Performed by: INTERNAL MEDICINE

## 2019-12-20 PROCEDURE — 87077 CULTURE AEROBIC IDENTIFY: CPT

## 2019-12-20 PROCEDURE — 84702 CHORIONIC GONADOTROPIN TEST: CPT

## 2019-12-20 PROCEDURE — 83605 ASSAY OF LACTIC ACID: CPT

## 2019-12-20 PROCEDURE — 80307 DRUG TEST PRSMV CHEM ANLYZR: CPT

## 2019-12-20 PROCEDURE — 99285 EMERGENCY DEPT VISIT HI MDM: CPT

## 2019-12-20 PROCEDURE — 36415 COLL VENOUS BLD VENIPUNCTURE: CPT

## 2019-12-20 PROCEDURE — 85610 PROTHROMBIN TIME: CPT

## 2019-12-20 PROCEDURE — 96365 THER/PROPH/DIAG IV INF INIT: CPT

## 2019-12-20 PROCEDURE — 87186 SC STD MICRODIL/AGAR DIL: CPT

## 2019-12-20 PROCEDURE — 81025 URINE PREGNANCY TEST: CPT | Performed by: OBSTETRICS & GYNECOLOGY

## 2019-12-20 PROCEDURE — 85025 COMPLETE CBC W/AUTO DIFF WBC: CPT

## 2019-12-20 RX ORDER — AMOXICILLIN 250 MG
2 CAPSULE ORAL 2 TIMES DAILY
Status: DISCONTINUED | OUTPATIENT
Start: 2019-12-20 | End: 2019-12-22 | Stop reason: HOSPADM

## 2019-12-20 RX ORDER — ACETAMINOPHEN 325 MG/1
650 TABLET ORAL EVERY 6 HOURS PRN
Status: DISCONTINUED | OUTPATIENT
Start: 2019-12-20 | End: 2019-12-22 | Stop reason: HOSPADM

## 2019-12-20 RX ORDER — POLYETHYLENE GLYCOL 3350 17 G/17G
1 POWDER, FOR SOLUTION ORAL
Status: DISCONTINUED | OUTPATIENT
Start: 2019-12-20 | End: 2019-12-20

## 2019-12-20 RX ORDER — SODIUM CHLORIDE, SODIUM LACTATE, POTASSIUM CHLORIDE, AND CALCIUM CHLORIDE .6; .31; .03; .02 G/100ML; G/100ML; G/100ML; G/100ML
30 INJECTION, SOLUTION INTRAVENOUS
Status: DISCONTINUED | OUTPATIENT
Start: 2019-12-20 | End: 2019-12-22 | Stop reason: HOSPADM

## 2019-12-20 RX ORDER — BISACODYL 10 MG
10 SUPPOSITORY, RECTAL RECTAL
Status: DISCONTINUED | OUTPATIENT
Start: 2019-12-20 | End: 2019-12-22 | Stop reason: HOSPADM

## 2019-12-20 RX ORDER — SODIUM CHLORIDE, SODIUM LACTATE, POTASSIUM CHLORIDE, CALCIUM CHLORIDE 600; 310; 30; 20 MG/100ML; MG/100ML; MG/100ML; MG/100ML
INJECTION, SOLUTION INTRAVENOUS CONTINUOUS
Status: DISCONTINUED | OUTPATIENT
Start: 2019-12-20 | End: 2019-12-22 | Stop reason: HOSPADM

## 2019-12-20 RX ORDER — SODIUM CHLORIDE, SODIUM LACTATE, POTASSIUM CHLORIDE, AND CALCIUM CHLORIDE .6; .31; .03; .02 G/100ML; G/100ML; G/100ML; G/100ML
1000 INJECTION, SOLUTION INTRAVENOUS
Status: COMPLETED | OUTPATIENT
Start: 2019-12-20 | End: 2019-12-20

## 2019-12-20 RX ADMIN — SODIUM CHLORIDE, POTASSIUM CHLORIDE, SODIUM LACTATE AND CALCIUM CHLORIDE: 600; 310; 30; 20 INJECTION, SOLUTION INTRAVENOUS at 18:00

## 2019-12-20 RX ADMIN — SODIUM CHLORIDE, POTASSIUM CHLORIDE, SODIUM LACTATE AND CALCIUM CHLORIDE 1000 ML: 600; 310; 30; 20 INJECTION, SOLUTION INTRAVENOUS at 16:57

## 2019-12-20 RX ADMIN — ACETAMINOPHEN 650 MG: 325 TABLET, FILM COATED ORAL at 22:26

## 2019-12-20 RX ADMIN — CEFTRIAXONE SODIUM 1 G: 1 INJECTION, POWDER, FOR SOLUTION INTRAMUSCULAR; INTRAVENOUS at 16:41

## 2019-12-20 ASSESSMENT — ENCOUNTER SYMPTOMS
VOMITING: 0
FEVER: 1
FLANK PAIN: 1
EYE DISCHARGE: 0
ORTHOPNEA: 0
NECK PAIN: 0
CHILLS: 1
MYALGIAS: 0
ABDOMINAL PAIN: 0
EYE PAIN: 0
DIARRHEA: 0
EYE REDNESS: 0
SPUTUM PRODUCTION: 0
NAUSEA: 0
HEADACHES: 0
COUGH: 0
STRIDOR: 0
WEIGHT LOSS: 0
BLURRED VISION: 0
FOCAL WEAKNESS: 0
INSOMNIA: 0
HEARTBURN: 0
DIZZINESS: 0
BACK PAIN: 0
SHORTNESS OF BREATH: 0
PALPITATIONS: 0
DEPRESSION: 0
NERVOUS/ANXIOUS: 0
SEIZURES: 0

## 2019-12-20 ASSESSMENT — PAIN SCALES - WONG BAKER: WONGBAKER_NUMERICALRESPONSE: HURTS A WHOLE LOT

## 2019-12-20 NOTE — PROGRESS NOTES
"Stephanie Hoffmann,  24 y.o.  female presents today with a C/O of :oligomenorrhea. Pt   No LMP recorded. Patient is pregnant.       Subjective : Nausea/Vomiting: Yes:  Abdominal /pelvic cramping : Yes :   vaginal bleeding: pinkish , with vcoiding . Also , with back pain , urinary frequency, dribbling , and dysuria       Menstrual Flow : heavy   GYN ROS:  normal menses, no abnormal bleeding, pelvic pain or discharge, no breast pain or new or enlarging lumps on self exam      Past Medical History:   Diagnosis Date   • Anemia    • Anxiety    • Head ache    • Hypertension     PIH   • Substance abuse (HCC) 2018    Patient states last used 3 days ago.    • Urinary tract infection        History reviewed. No pertinent surgical history.    Current Birth control:  none    OB History    Para Term  AB Living   4 1 1   2 1   SAB TAB Ectopic Molar Multiple Live Births             1      # Outcome Date GA Lbr Allan/2nd Weight Sex Delivery Anes PTL Lv   4 Current            3 Term 17 39w0d  2.892 kg (6 lb 6 oz) M Vag-Spont EPI N VERONICA      Birth Comments: Patient states had PIH   2 AB               Birth Comments: Pt states no complications with procedure   1 AB               Birth Comments: Pt states no complications.            Allergy:      Patient has no known allergies.    Exam;    /86   Ht 1.6 m (5' 3\")   Wt 55.7 kg (122 lb 11.2 oz)   BMI 21.74 kg/m²   T- 100.0 degrees   well-hydrated, thin, moderately ill-appearing  normal; Back:  +2 R-CVA tenderness   PERRLA, EOMI, fundi grossly normal, no papilledema, no AV nicking, sclera clear  Clear to auscultation  RRR No M  abdomen is soft without significant tenderness, masses, organomegaly or guarding  External genitalia normal, Vagina normal without dischargeLab.    No results found for this or any previous visit (from the past 336 hour(s)).  Ultrasound :     Per my Read   Transvaginal     First trimester findings: Intrauterine gestational sac " seen: Yes Twin Sacs, appear thick dividing membrane   Gestational sac summary: fetal pole seen, fetal cardiac activity, EGA: 12 weeks + 4 days  Fetal cardiac activity: present  Crown-rump length: 5.42 cm Twin A                                     5.30 cm  Twin B   EROS 06/29/2020      Labs : POC  : UA   Blood : large  Nit Positive   Barbara: large     Assessment:  Ascending Right pyelonephritis  HTN   dysfunctional uterine bleeding    Plan:  Needs hospital admission : hospitalist  service and OB to consult   Told to go to ER

## 2019-12-20 NOTE — ED TRIAGE NOTES
Pt ambulated to triage    Chief Complaint   Patient presents with   • Sent by MD     kidney infection, preg with twins, and htn     Discussed triage process, given urine cup for sample.

## 2019-12-20 NOTE — PROGRESS NOTES
Pt here for  visit   # [phone]  Pt states having a UTI symptoms.   Pregnancy test positive   LMP: pt states end of October.   Temp in clinic 100.0

## 2019-12-21 LAB
ALBUMIN SERPL BCP-MCNC: 3.3 G/DL (ref 3.2–4.9)
ALBUMIN/GLOB SERPL: 1.3 G/DL
ALP SERPL-CCNC: 59 U/L (ref 30–99)
ALT SERPL-CCNC: 8 U/L (ref 2–50)
ANION GAP SERPL CALC-SCNC: 10 MMOL/L (ref 0–11.9)
AST SERPL-CCNC: 13 U/L (ref 12–45)
BILIRUB SERPL-MCNC: 0.6 MG/DL (ref 0.1–1.5)
BUN SERPL-MCNC: 6 MG/DL (ref 8–22)
CALCIUM SERPL-MCNC: 8.4 MG/DL (ref 8.5–10.5)
CHLORIDE SERPL-SCNC: 104 MMOL/L (ref 96–112)
CO2 SERPL-SCNC: 19 MMOL/L (ref 20–33)
CREAT SERPL-MCNC: 0.45 MG/DL (ref 0.5–1.4)
GLOBULIN SER CALC-MCNC: 2.6 G/DL (ref 1.9–3.5)
GLUCOSE SERPL-MCNC: 108 MG/DL (ref 65–99)
LACTATE BLD-SCNC: 1 MMOL/L (ref 0.5–2)
POTASSIUM SERPL-SCNC: 3.7 MMOL/L (ref 3.6–5.5)
PROT SERPL-MCNC: 5.9 G/DL (ref 6–8.2)
SODIUM SERPL-SCNC: 133 MMOL/L (ref 135–145)

## 2019-12-21 PROCEDURE — 700111 HCHG RX REV CODE 636 W/ 250 OVERRIDE (IP): Performed by: INTERNAL MEDICINE

## 2019-12-21 PROCEDURE — 80053 COMPREHEN METABOLIC PANEL: CPT

## 2019-12-21 PROCEDURE — 83605 ASSAY OF LACTIC ACID: CPT

## 2019-12-21 PROCEDURE — 770020 HCHG ROOM/CARE - TELE (206)

## 2019-12-21 PROCEDURE — A9270 NON-COVERED ITEM OR SERVICE: HCPCS | Performed by: INTERNAL MEDICINE

## 2019-12-21 PROCEDURE — 302128 INFUSION PUMP: Performed by: HOSPITALIST

## 2019-12-21 PROCEDURE — 700105 HCHG RX REV CODE 258: Performed by: INTERNAL MEDICINE

## 2019-12-21 PROCEDURE — 99232 SBSQ HOSP IP/OBS MODERATE 35: CPT | Performed by: HOSPITALIST

## 2019-12-21 PROCEDURE — 700102 HCHG RX REV CODE 250 W/ 637 OVERRIDE(OP): Performed by: INTERNAL MEDICINE

## 2019-12-21 RX ADMIN — ACETAMINOPHEN 650 MG: 325 TABLET, FILM COATED ORAL at 11:14

## 2019-12-21 RX ADMIN — SODIUM CHLORIDE, POTASSIUM CHLORIDE, SODIUM LACTATE AND CALCIUM CHLORIDE: 600; 310; 30; 20 INJECTION, SOLUTION INTRAVENOUS at 11:16

## 2019-12-21 RX ADMIN — CEFTRIAXONE SODIUM 1 G: 1 INJECTION, POWDER, FOR SOLUTION INTRAMUSCULAR; INTRAVENOUS at 15:43

## 2019-12-21 RX ADMIN — ACETAMINOPHEN 650 MG: 325 TABLET, FILM COATED ORAL at 17:13

## 2019-12-21 ASSESSMENT — ENCOUNTER SYMPTOMS
ABDOMINAL PAIN: 0
PSYCHIATRIC NEGATIVE: 1
NAUSEA: 0
FLANK PAIN: 1
LOSS OF CONSCIOUSNESS: 0
HEADACHES: 0
COUGH: 0
CHILLS: 0
PALPITATIONS: 0
VOMITING: 0
FEVER: 1
FALLS: 0
SHORTNESS OF BREATH: 0

## 2019-12-21 ASSESSMENT — COGNITIVE AND FUNCTIONAL STATUS - GENERAL
DAILY ACTIVITIY SCORE: 24
SUGGESTED CMS G CODE MODIFIER DAILY ACTIVITY: CH
MOBILITY SCORE: 24
SUGGESTED CMS G CODE MODIFIER MOBILITY: CH

## 2019-12-21 ASSESSMENT — PATIENT HEALTH QUESTIONNAIRE - PHQ9
SUM OF ALL RESPONSES TO PHQ9 QUESTIONS 1 AND 2: 0
2. FEELING DOWN, DEPRESSED, IRRITABLE, OR HOPELESS: NOT AT ALL
1. LITTLE INTEREST OR PLEASURE IN DOING THINGS: NOT AT ALL

## 2019-12-21 NOTE — ASSESSMENT & PLAN NOTE
This is Sepsis Present on admission  SIRS criteria identified on my evaluation include: Tachycardia, with heart rate greater than 90 BPM and Leukocyosis, with WBC greater than 12,000. Source is pyelonephritis. Sepsis protocol initiated  - patient reassessed at the bedside  - continue with ceftriaxone

## 2019-12-21 NOTE — PROGRESS NOTES
[facility] Medicine Daily Progress Note    Date of Service  12/21/2019    Chief Complaint  24 y.o. female admitted 12/20/2019 with right flank pain.    Interval Problem Update  Still with abdominal and flank pain. Just had a dose of tylenol. Still in the ED, waiting for a bed to become available.     Consultants/Specialty  None    Code Status  Full    Disposition  Anticipate home in 1-2 days.    Review of Systems  Review of Systems   Constitutional: Positive for fever and malaise/fatigue. Negative for chills.   Respiratory: Negative for cough and shortness of breath.    Cardiovascular: Negative for chest pain, palpitations and leg swelling.   Gastrointestinal: Negative for abdominal pain, nausea and vomiting.   Genitourinary: Positive for flank pain.   Musculoskeletal: Negative for falls.   Neurological: Negative for loss of consciousness and headaches.   Psychiatric/Behavioral: Negative.    All other systems reviewed and are negative.       Physical Exam  Temp:  [37.6 °C (99.6 °F)] 37.6 °C (99.6 °F)  Pulse:  [113-133] 114  Resp:  [14-60] 14  BP: (125-164)/() 146/97  SpO2:  [97 %-100 %] 98 %    Physical Exam  Vitals signs reviewed.   Constitutional:       Appearance: She is not toxic-appearing or diaphoretic.   HENT:      Head: Normocephalic.      Mouth/Throat:      Comments: Tacky mucous membranes  Eyes:      General: No scleral icterus.     Extraocular Movements: Extraocular movements intact.   Neck:      Musculoskeletal: Normal range of motion. No muscular tenderness.   Cardiovascular:      Rate and Rhythm: Normal rate and regular rhythm.      Pulses: Normal pulses.   Pulmonary:      Effort: Pulmonary effort is normal. No respiratory distress.      Breath sounds: No wheezing or rales.   Abdominal:      General: Bowel sounds are normal. There is no distension.      Palpations: Abdomen is soft.      Tenderness: There is tenderness. There is right CVA tenderness. There is no left CVA tenderness or guarding.    Musculoskeletal:         General: No swelling or tenderness.   Neurological:      General: No focal deficit present.      Mental Status: She is alert and oriented to person, place, and time.   Psychiatric:         Mood and Affect: Mood normal.         Speech: Speech normal.         Behavior: Behavior normal.         Fluids    Intake/Output Summary (Last 24 hours) at 12/21/2019 0801  Last data filed at 12/20/2019 2131  Gross per 24 hour   Intake 1100 ml   Output --   Net 1100 ml       Laboratory  Recent Labs     12/20/19  1505   WBC 19.1*   RBC 4.38   HEMOGLOBIN 14.5   HEMATOCRIT 40.7   MCV 92.9   MCH 33.1*   MCHC 35.6*   RDW 40.3   PLATELETCT 313   MPV 8.8*     Recent Labs     12/20/19  1505 12/21/19  0356   SODIUM 134* 133*   POTASSIUM 3.7 3.7   CHLORIDE 105 104   CO2 20 19*   GLUCOSE 104* 108*   BUN 7* 6*   CREATININE 0.58 0.45*   CALCIUM 9.1 8.4*     Recent Labs     12/20/19  1505   INR 1.00               Imaging  No orders to display        Assessment/Plan  * Sepsis (HCC)- (present on admission)  Assessment & Plan  This is Sepsis Present on admission  SIRS criteria identified on my evaluation include: Tachycardia, with heart rate greater than 90 BPM and Leukocyosis, with WBC greater than 12,000. Source is pyelonephritis. Sepsis protocol initiated  - patient reassessed at the bedside  - continue with ceftriaxone    Acute pyelonephritis- (present on admission)  Assessment & Plan  - management as above  - urine culture pending: GNR  - follow up blood cultures    12 weeks gestation of pregnancy- (present on admission)  Assessment & Plan  12 weeks pregnant with twins  - outpatient follow up with OB       VTE prophylaxis: ambulating

## 2019-12-21 NOTE — ED NOTES
Report received from Alexandra CABELLO pt updated on plan of care, pt states she has no questions at this time.

## 2019-12-21 NOTE — ED PROVIDER NOTES
ED Provider Note    CHIEF COMPLAINT  Chief Complaint   Patient presents with   • Sent by MD     kidney infection, preg with twins, and htn       HPI  Stephanie Hoffmann is a 24 y.o. female who presents sent in by her OB for flank pain, borderline fever and suspected pyelonephritis.  The patient's last menstrual period was 12 weeks ago.  She is approximately a G6, P1 T4.  She learned today she has a twin gestation.  Patient had urinary symptoms of dysuria and frequency for 4 days.  She has right flank pain.  No real abdominal pain or bleeding.  No diabetes or immune compromise.  Meth use 4 days ago.  History of methamphetamine use a few days ago.    REVIEW OF SYSTEMS  Pertinent positives include: Leg pain, low-grade fever, dysuria, frequency.  Pertinent negatives include: Nausea, vomiting, diarrhea, constipation, vaginal bleeding.  10+ systems reviewed and negative.      PAST MEDICAL HISTORY  Past Medical History:   Diagnosis Date   • Anemia    • Anxiety    • Head ache    • Hypertension     PIH   • Substance abuse (HCC) 2018    Patient states last used 3 days ago.    • Urinary tract infection        FAMILY HISTORY  Family History   Problem Relation Age of Onset   • No Known Problems Mother    • No Known Problems Father    • No Known Problems Sister    • Diabetes Brother    • Thyroid Brother        SOCIAL HISTORY  Social History     Tobacco Use   • Smoking status: Current Every Day Smoker     Packs/day: 0.25     Years: 12.00     Pack years: 3.00     Types: Cigarettes   • Tobacco comment: Pt states smoking 4 a day.    Substance Use Topics   • Alcohol use: No   • Drug use: Yes     Types: Methamphetamines, Marijuana     Comment: Pt states last used 3 days ago, marijuana now and then     Social History     Substance and Sexual Activity   Drug Use Yes   • Types: Methamphetamines, Marijuana    Comment: Pt states last used 3 days ago, marijuana now and then       CURRENT MEDICATIONS    Current Outpatient Medications  "  Medication Sig Dispense Refill   • Prenatal MV-Min-Fe Fum-FA-DHA (PRENATAL 1 PO) Take  by mouth.     • ondansetron (ZOFRAN) 4 MG TABS Take 1 Tab by mouth every 8 hours as needed for Nausea/Vomiting. 10 Each 0       ALLERGIES  No Known Allergies    PHYSICAL EXAM  VITAL SIGNS: /84   Pulse (!) 119   Temp 37.6 °C (99.6 °F) (Temporal)   Resp 19   Ht 1.6 m (5' 3\")   Wt 55.8 kg (123 lb 0.3 oz)   SpO2 99%   BMI 21.79 kg/m²   Reviewed and hypertensive, tachycardic, afebrile  Constitutional: Well developed, Well nourished, well-appearing.  HENT: Normocephalic, atraumatic, bilateral external ears normal, oropharynx moist, No exudates or erythema.   Eyes: PERRLA, conjunctiva pink, no scleral icterus.   Cardiovascular: Regular tachycardic without murmur, rub, gallop.  Respiratory: No rales, rhonchi, wheeze.  Gastrointestinal: Soft, nontender, nondistended, no palpable uterus.  Skin: No erythema, no rash.   Genitourinary: Mild right costovertebral angle tenderness.   Neurologic: Alert & oriented x 3, cranial nerves 2-12 intact by passive exam.  No focal deficit noted.  Psychiatric: Affect normal, Judgment normal, Mood normal.     DIFFERENTIAL DIAGNOSIS:  Pyelonephritis, sepsis, renal colic, doubt musculoskeletal pain.    LABORATORY:  Results for orders placed or performed during the hospital encounter of 12/20/19   CBC WITH DIFFERENTIAL   Result Value Ref Range    WBC 19.1 (H) 4.8 - 10.8 K/uL    RBC 4.38 4.20 - 5.40 M/uL    Hemoglobin 14.5 12.0 - 16.0 g/dL    Hematocrit 40.7 37.0 - 47.0 %    MCV 92.9 81.4 - 97.8 fL    MCH 33.1 (H) 27.0 - 33.0 pg    MCHC 35.6 (H) 33.6 - 35.0 g/dL    RDW 40.3 35.9 - 50.0 fL    Platelet Count 313 164 - 446 K/uL    MPV 8.8 (L) 9.0 - 12.9 fL    Neutrophils-Polys 91.40 (H) 44.00 - 72.00 %    Lymphocytes 4.70 (L) 22.00 - 41.00 %    Monocytes 3.00 0.00 - 13.40 %    Eosinophils 0.10 0.00 - 6.90 %    Basophils 0.20 0.00 - 1.80 %    Immature Granulocytes 0.60 0.00 - 0.90 %    Nucleated RBC " 0.00 /100 WBC    Neutrophils (Absolute) 17.42 (H) 2.00 - 7.15 K/uL    Lymphs (Absolute) 0.89 (L) 1.00 - 4.80 K/uL    Monos (Absolute) 0.58 0.00 - 0.85 K/uL    Eos (Absolute) 0.01 0.00 - 0.51 K/uL    Baso (Absolute) 0.03 0.00 - 0.12 K/uL    Immature Granulocytes (abs) 0.12 (H) 0.00 - 0.11 K/uL    NRBC (Absolute) 0.00 K/uL   COMP METABOLIC PANEL   Result Value Ref Range    Sodium 134 (L) 135 - 145 mmol/L    Potassium 3.7 3.6 - 5.5 mmol/L    Chloride 105 96 - 112 mmol/L    Co2 20 20 - 33 mmol/L    Anion Gap 9.0 0.0 - 11.9    Glucose 104 (H) 65 - 99 mg/dL    Bun 7 (L) 8 - 22 mg/dL    Creatinine 0.58 0.50 - 1.40 mg/dL    Calcium 9.1 8.5 - 10.5 mg/dL    AST(SGOT) 10 (L) 12 - 45 U/L    ALT(SGPT) 9 2 - 50 U/L    Alkaline Phosphatase 63 30 - 99 U/L    Total Bilirubin 0.7 0.1 - 1.5 mg/dL    Albumin 4.0 3.2 - 4.9 g/dL    Total Protein 6.9 6.0 - 8.2 g/dL    Globulin 2.9 1.9 - 3.5 g/dL    A-G Ratio 1.4 g/dL   LIPASE   Result Value Ref Range    Lipase 7 (L) 11 - 82 U/L   HCG QUANTITATIVE   Result Value Ref Range    Bhcg 953804.1 (H) 0.0 - 5.0 mIU/mL   URINALYSIS,CULTURE IF INDICATED   Result Value Ref Range    Color Yellow     Character Cloudy (A)     Specific Gravity 1.020 <1.035    Ph 8.0 5.0 - 8.0    Glucose Negative Negative mg/dL    Ketones Negative Negative mg/dL    Protein 100 (A) Negative mg/dL    Bilirubin Negative Negative    Urobilinogen, Urine 1.0 Negative    Nitrite Positive (A) Negative    Leukocyte Esterase Large (A) Negative    Occult Blood Moderate (A) Negative    Micro Urine Req Microscopic    ESTIMATED GFR   Result Value Ref Range    GFR If African American >60 >60 mL/min/1.73 m 2    GFR If Non African American >60 >60 mL/min/1.73 m 2   URINE DRUG SCREEN   Result Value Ref Range    Amphetamines Urine Positive (A) Negative    Barbiturates Negative Negative    Benzodiazepines Negative Negative    Cocaine Metabolite Negative Negative    Methadone Negative Negative    Opiates Negative Negative    Oxycodone Negative  Negative    Phencyclidine -Pcp Negative Negative    Propoxyphene Negative Negative    Cannabinoid Metab Negative Negative   URINE MICROSCOPIC (W/UA)   Result Value Ref Range    WBC  (A) /hpf    RBC 2-5 (A) /hpf    Bacteria Many (A) None /hpf    Epithelial Cells Few /hpf    Hyaline Cast 0-2 /lpf   LACTIC ACID   Result Value Ref Range    Lactic Acid 1.6 0.5 - 2.0 mmol/L       INTERVENTIONS: Indication IV fluids suspected sepsis for which p.o. hydration and adequate  Medications   lactated ringers infusion (BOLUS) (has no administration in time range)   cefTRIAXone (ROCEPHIN) 2 g in  mL IVPB (has no administration in time range)     Sponsor: Improved hydration on repeat assessment    COURSE & MEDICAL DECISION MAKING  Case discussed with Dr. Baeza hospitalist who will admit the patient for IV fluids and IV antibiotics Case discussed with Dr. Wagner GYN.    This patient presents with pyelonephritis leukocytosis and tachycardia with a 1212-week intrauterine pregnancy.  She appears to have early sepsis.  There is no evidence of shock threatened  or organ failure.  There is no immune compromise.    PLAN:  IV fluids and IV antibiotics    CONDITION: Fair.    FINAL IMPRESSION  1. Pyelonephritis    2. 12 weeks gestation of pregnancy    3. Methamphetamine abuse (HCC)          Electronically signed by: Gonzalo Ocampo, 2019 4:23 PM

## 2019-12-21 NOTE — ED NOTES
Pt asleep on gurney; Respirations are equal and non labored. Pt has no further needs at this time.     RN will continue to monitor

## 2019-12-21 NOTE — PROGRESS NOTES
Pt is sleeping at this time. Easily arousable. Pt provided ice water. Notified that her mom is attempting to contact her.

## 2019-12-21 NOTE — H&P
Hospital Medicine History & Physical Note    Date of Service  12/20/2019    Primary Care Physician  No primary care provider on file.    Consultants  none    Code Status  full    Chief Complaint  Flank pain    History of Presenting Illness  24 y.o. female who is 12 weeks pregnant with twins who presented 12/20/2019 with right flank pain for the past 2 days.  She stated that she started having dysuria 4 days ago and later she started having right flank pain.  Associated with fever and chills.  She finally decided to come to ER today where she was found to have sepsis related to pyelonephritis.  Sepsis protocol was started and the patient was given IV antibiotic.  She will be admitted to telemetry floor for further management.    Review of Systems  Review of Systems   Constitutional: Positive for chills and fever. Negative for weight loss.   HENT: Negative for congestion and nosebleeds.    Eyes: Negative for blurred vision, pain, discharge and redness.   Respiratory: Negative for cough, sputum production, shortness of breath and stridor.    Cardiovascular: Negative for chest pain, palpitations and orthopnea.   Gastrointestinal: Negative for abdominal pain, diarrhea, heartburn, nausea and vomiting.   Genitourinary: Positive for dysuria and flank pain. Negative for frequency and urgency.   Musculoskeletal: Negative for back pain, myalgias and neck pain.   Skin: Negative for itching and rash.   Neurological: Negative for dizziness, focal weakness, seizures and headaches.   Psychiatric/Behavioral: Negative for depression. The patient is not nervous/anxious and does not have insomnia.        Past Medical History   has a past medical history of Anemia, Anxiety, Head ache, Hypertension, Substance abuse (HCC) (2018), and Urinary tract infection. She also has no past medical history of Addisons disease ([facility]), Adrenal disorder ([facility]), Allergy, Arrhythmia, Arthritis, Asthma, Blood transfusion without reported diagnosis, Cancer  (HCC), Cataract, CHF (congestive heart failure) (HCC), Clotting disorder (HCC), COPD (chronic obstructive pulmonary disease) (HCC), Cushings syndrome (HCC), Depression, Diabetes (HCC), Diabetic neuropathy (HCC), Glaucoma, Goiter, Heart attack (HCC), Heart murmur, HIV (human immunodeficiency virus infection) (HCC), Hyperlipidemia, IBD (inflammatory bowel disease), Kidney disease, Meningitis, Migraine, Muscle disorder, Osteoporosis, Parathyroid disorder (HCC), Pituitary disease (HCC), Pulmonary emphysema (HCC), Seizure (HCC), Sickle cell disease (HCC), Stroke (HCC), Thyroid disease, or Tuberculosis.    Surgical History  Reviewed and no pertinent surgical history    Family History  family history includes Diabetes in her brother; No Known Problems in her father, mother, and sister; Thyroid in her brother.     Social History   reports that she has been smoking cigarettes. She has a 3.00 pack-year smoking history. She does not have any smokeless tobacco history on file. She reports current drug use. Drugs: Methamphetamines and Marijuana. She reports that she does not drink alcohol.    Allergies  No Known Allergies    Medications  Prior to Admission Medications   Prescriptions Last Dose Informant Patient Reported? Taking?   Prenatal MV-Min-Fe Fum-FA-DHA (PRENATAL 1 PO)   Yes No   Sig: Take  by mouth.   ondansetron (ZOFRAN) 4 MG TABS   No No   Sig: Take 1 Tab by mouth every 8 hours as needed for Nausea/Vomiting.      Facility-Administered Medications: None       Physical Exam  Temp:  [37.6 °C (99.6 °F)] 37.6 °C (99.6 °F)  Pulse:  [113-129] 113  Resp:  [18-19] 18  BP: (137-162)/() 137/90  SpO2:  [99 %-100 %] 99 %    Physical Exam  Vitals signs reviewed.   Constitutional:       General: She is not in acute distress.     Appearance: Normal appearance.   HENT:      Head: Normocephalic and atraumatic.      Nose: No congestion or rhinorrhea.   Eyes:      Extraocular Movements: Extraocular movements intact.      Pupils:  Pupils are equal, round, and reactive to light.   Neck:      Musculoskeletal: Normal range of motion and neck supple.   Cardiovascular:      Rate and Rhythm: Normal rate and regular rhythm.      Pulses: Normal pulses.   Pulmonary:      Effort: Pulmonary effort is normal. No respiratory distress.      Breath sounds: Normal breath sounds.   Abdominal:      General: Bowel sounds are normal. There is no distension.      Palpations: Abdomen is soft.      Tenderness: There is tenderness.   Musculoskeletal:         General: No swelling or tenderness.   Skin:     General: Skin is warm.      Findings: No erythema.   Neurological:      General: No focal deficit present.      Mental Status: She is alert and oriented to person, place, and time.         Laboratory:  Recent Labs     12/20/19  1505   WBC 19.1*   RBC 4.38   HEMOGLOBIN 14.5   HEMATOCRIT 40.7   MCV 92.9   MCH 33.1*   MCHC 35.6*   RDW 40.3   PLATELETCT 313   MPV 8.8*     Recent Labs     12/20/19  1505   SODIUM 134*   POTASSIUM 3.7   CHLORIDE 105   CO2 20   GLUCOSE 104*   BUN 7*   CREATININE 0.58   CALCIUM 9.1     Recent Labs     12/20/19  1505   ALTSGPT 9   ASTSGOT 10*   ALKPHOSPHAT 63   TBILIRUBIN 0.7   LIPASE 7*   GLUCOSE 104*         No results for input(s): NTPROBNP in the last 72 hours.      No results for input(s): TROPONINT in the last 72 hours.    Urinalysis:    Recent Labs     12/20/19  1510   SPECGRAVITY 1.020   GLUCOSEUR Negative   KETONES Negative   NITRITE Positive*   LEUKESTERAS Large*   WBCURINE *   RBCURINE 2-5*   BACTERIA Many*   EPITHELCELL Few        Imaging:  No orders to display         Assessment/Plan:  I anticipate this patient will require at least two midnights for appropriate medical management, necessitating inpatient admission.    Sepsis (HCC)- (present on admission)  Assessment & Plan  This is Sepsis Present on admission  SIRS criteria identified on my evaluation include: Tachycardia, with heart rate greater than 90 BPM and  Leukocyosis, with WBC greater than 12,000  Source is pyelonephritis  Sepsis protocol initiated  Fluid resuscitation ordered per protocol  IV antibiotics as appropriate for source of sepsis  While organ dysfunction may be noted elsewhere in this problem list or in the chart, degree of organ dysfunction does not meet CMS criteria for severe sepsis          12 weeks gestation of pregnancy- (present on admission)  Assessment & Plan  With twins    Leukocytosis- (present on admission)  Assessment & Plan  Related to above    Acute pyelonephritis- (present on admission)  Assessment & Plan  I started her on IV ceftriaxone  Follow cultures      VTE prophylaxis: ambulatory

## 2019-12-22 VITALS
OXYGEN SATURATION: 95 % | HEIGHT: 63 IN | TEMPERATURE: 98.5 F | RESPIRATION RATE: 16 BRPM | SYSTOLIC BLOOD PRESSURE: 123 MMHG | BODY MASS INDEX: 21.91 KG/M2 | HEART RATE: 100 BPM | DIASTOLIC BLOOD PRESSURE: 80 MMHG | WEIGHT: 123.68 LBS

## 2019-12-22 PROBLEM — A41.9 SEPSIS (HCC): Status: RESOLVED | Noted: 2019-12-20 | Resolved: 2019-12-22

## 2019-12-22 LAB
ANION GAP SERPL CALC-SCNC: 13 MMOL/L (ref 0–11.9)
BACTERIA UR CULT: ABNORMAL
BACTERIA UR CULT: ABNORMAL
BASOPHILS # BLD AUTO: 0.5 % (ref 0–1.8)
BASOPHILS # BLD: 0.06 K/UL (ref 0–0.12)
BUN SERPL-MCNC: 5 MG/DL (ref 8–22)
CALCIUM SERPL-MCNC: 8.5 MG/DL (ref 8.5–10.5)
CHLORIDE SERPL-SCNC: 105 MMOL/L (ref 96–112)
CO2 SERPL-SCNC: 15 MMOL/L (ref 20–33)
CREAT SERPL-MCNC: 0.52 MG/DL (ref 0.5–1.4)
EOSINOPHIL # BLD AUTO: 0.03 K/UL (ref 0–0.51)
EOSINOPHIL NFR BLD: 0.2 % (ref 0–6.9)
ERYTHROCYTE [DISTWIDTH] IN BLOOD BY AUTOMATED COUNT: 43 FL (ref 35.9–50)
GLUCOSE SERPL-MCNC: 105 MG/DL (ref 65–99)
HCT VFR BLD AUTO: 39.2 % (ref 37–47)
HGB BLD-MCNC: 13.3 G/DL (ref 12–16)
IMM GRANULOCYTES # BLD AUTO: 0.09 K/UL (ref 0–0.11)
IMM GRANULOCYTES NFR BLD AUTO: 0.7 % (ref 0–0.9)
LYMPHOCYTES # BLD AUTO: 1.2 K/UL (ref 1–4.8)
LYMPHOCYTES NFR BLD: 9 % (ref 22–41)
MCH RBC QN AUTO: 33.2 PG (ref 27–33)
MCHC RBC AUTO-ENTMCNC: 33.9 G/DL (ref 33.6–35)
MCV RBC AUTO: 97.8 FL (ref 81.4–97.8)
MONOCYTES # BLD AUTO: 1.07 K/UL (ref 0–0.85)
MONOCYTES NFR BLD AUTO: 8.1 % (ref 0–13.4)
NEUTROPHILS # BLD AUTO: 10.82 K/UL (ref 2–7.15)
NEUTROPHILS NFR BLD: 81.5 % (ref 44–72)
NRBC # BLD AUTO: 0 K/UL
NRBC BLD-RTO: 0 /100 WBC
PLATELET # BLD AUTO: 274 K/UL (ref 164–446)
PMV BLD AUTO: 9.4 FL (ref 9–12.9)
POTASSIUM SERPL-SCNC: 4.1 MMOL/L (ref 3.6–5.5)
RBC # BLD AUTO: 4.01 M/UL (ref 4.2–5.4)
SIGNIFICANT IND 70042: ABNORMAL
SITE SITE: ABNORMAL
SODIUM SERPL-SCNC: 133 MMOL/L (ref 135–145)
SOURCE SOURCE: ABNORMAL
WBC # BLD AUTO: 13.3 K/UL (ref 4.8–10.8)

## 2019-12-22 PROCEDURE — 36415 COLL VENOUS BLD VENIPUNCTURE: CPT

## 2019-12-22 PROCEDURE — 99239 HOSP IP/OBS DSCHRG MGMT >30: CPT | Performed by: HOSPITALIST

## 2019-12-22 PROCEDURE — 700102 HCHG RX REV CODE 250 W/ 637 OVERRIDE(OP): Performed by: INTERNAL MEDICINE

## 2019-12-22 PROCEDURE — 80048 BASIC METABOLIC PNL TOTAL CA: CPT

## 2019-12-22 PROCEDURE — 85025 COMPLETE CBC W/AUTO DIFF WBC: CPT

## 2019-12-22 PROCEDURE — A9270 NON-COVERED ITEM OR SERVICE: HCPCS | Performed by: INTERNAL MEDICINE

## 2019-12-22 RX ORDER — NITROFURANTOIN 25; 75 MG/1; MG/1
100 CAPSULE ORAL 2 TIMES DAILY
Qty: 6 CAP | Refills: 0 | Status: SHIPPED | OUTPATIENT
Start: 2019-12-22 | End: 2019-12-25

## 2019-12-22 RX ADMIN — ACETAMINOPHEN 650 MG: 325 TABLET, FILM COATED ORAL at 06:22

## 2019-12-22 NOTE — DISCHARGE INSTRUCTIONS
Discharge Instructions    Discharged to home by car with relative. Discharged via walking, hospital escort: Yes.  Special equipment needed: Not Applicable    Be sure to schedule a follow-up appointment with your primary care doctor or any specialists as instructed.     Discharge Plan:   Influenza Vaccine Indication: Patient Refuses    I understand that a diet low in cholesterol, fat, and sodium is recommended for good health. Unless I have been given specific instructions below for another diet, I accept this instruction as my diet prescription.   Other diet: Regular    Special Instructions: Sepsis, Adult  Sepsis is a serious infection of your blood or tissues that affects your whole body. The infection that causes sepsis may be bacterial, viral, fungal, or parasitic. Sepsis may be life threatening. Sepsis can cause your blood pressure to drop. This may result in shock. Shock causes your central nervous system and your organs to stop working correctly.  What increases the risk?  Sepsis can happen in anyone, but it is more likely to happen in people who have weakened immune systems.  What are the signs or symptoms?  Symptoms of sepsis can include:  · Fever or low body temperature (hypothermia).  · Rapid breathing (hyperventilation).  · Chills.  · Rapid heartbeat (tachycardia).  · Confusion or light-headedness.  · Trouble breathing.  · Urinating much less than usual.  · Cool, clammy skin or red, flushed skin.  · Other problems with the heart, kidneys, or brain.  How is this diagnosed?  Your health care provider will likely do tests to look for an infection, to see if the infection has spread to your blood, and to see how serious your condition is. Tests can include:  · Blood tests, including cultures of your blood.  · Cultures of other fluids from your body, such as:  ¨ Urine.  ¨ Pus from wounds.  ¨ Mucus coughed up from your lungs.  · Urine tests other than cultures.  · X-ray exams or other imaging tests.  How is  this treated?  Treatment will begin with elimination of the source of infection. If your sepsis is likely caused by a bacterial or fungal infection, you will be given antibiotic or antifungal medicines.  You may also receive:  · Oxygen.  · Fluids through an IV tube.  · Medicines to increase your blood pressure.  · A machine to clean your blood (dialysis) if your kidneys fail.  · A machine to help you breathe if your lungs fail.  Get help right away if:  You get an infection or develop any of the signs and symptoms of sepsis after surgery or a hospitalization.  This information is not intended to replace advice given to you by your health care provider. Make sure you discuss any questions you have with your health care provider.  Document Released: 09/15/2004 Document Revised: 05/25/2017 Document Reviewed: 08/25/2014  [employer] Interactive Patient Education © 2017 [employer]      · Is patient discharged on Warfarin / Coumadin?   No     Depression / Suicide Risk    As you are discharged from this [facility] facility, it is important to learn how to keep safe from harming yourself.    Recognize the warning signs:  · Abrupt changes in personality, positive or negative- including increase in energy   · Giving away possessions  · Change in eating patterns- significant weight changes-  positive or negative  · Change in sleeping patterns- unable to sleep or sleeping all the time   · Unwillingness or inability to communicate  · Depression  · Unusual sadness, discouragement and loneliness  · Talk of wanting to die  · Neglect of personal appearance   · Rebelliousness- reckless behavior  · Withdrawal from people/activities they love  · Confusion- inability to concentrate     If you or a loved one observes any of these behaviors or has concerns about self-harm, here's what you can do:  · Talk about it- your feelings and reasons for harming yourself  · Remove any means that you might use to hurt yourself (examples: pills,  rope, extension cords, firearm)  · Get professional help from the community (Mental Health, Substance Abuse, psychological counseling)  · Do not be alone:Call your Safe Contact- someone whom you trust who will be there for you.  · Call your local CRISIS HOTLINE 618-7181 or 744-419-2196  · Call your local Children's Mobile Crisis Response Team [place] (142) 524-2362 or www.[employer]  · Call the toll free National Suicide Prevention Hotlines   · National Suicide Prevention Lifeline 051-498-KKAK (0753)  · [place] Hope Line Network 800-SUICIDE (888-7766)    Nitrofurantoin tablets or capsules  What is this medicine?  NITROFURANTOIN (dinesh jobissac dino DAVIS jackson) is an antibiotic. It is used to treat urinary tract infections.  This medicine may be used for other purposes; ask your health care provider or pharmacist if you have questions.  COMMON BRAND NAME(S): Macrobid, Macrodantin, Urotoin  What should I tell my health care provider before I take this medicine?  They need to know if you have any of these conditions:  -anemia  -diabetes  -glucose-6-phosphate dehydrogenase deficiency  -kidney disease  -liver disease  -lung disease  -other chronic illness  -an unusual or allergic reaction to nitrofurantoin, other antibiotics, other medicines, foods, dyes or preservatives  -pregnant or trying to get pregnant  -breast-feeding  How should I use this medicine?  Take this medicine by mouth with a glass of water. Follow the directions on the prescription label. Take this medicine with food or milk. Take your doses at regular intervals. Do not take your medicine more often than directed. Do not stop taking except on your doctor's advice.  Talk to your pediatrician regarding the use of this medicine in children. While this drug may be prescribed for selected conditions, precautions do apply.  Overdosage: If you think you have taken too much of this medicine contact a poison control center or emergency room at once.  NOTE:  This medicine is only for you. Do not share this medicine with others.  What if I miss a dose?  If you miss a dose, take it as soon as you can. If it is almost time for your next dose, take only that dose. Do not take double or extra doses.  What may interact with this medicine?  -antacids containing magnesium trisilicate  -probenecid  -quinolone antibiotics like ciprofloxacin, lomefloxacin, norfloxacin and ofloxacin  -sulfinpyrazone  This list may not describe all possible interactions. Give your health care provider a list of all the medicines, herbs, non-prescription drugs, or dietary supplements you use. Also tell them if you smoke, drink alcohol, or use illegal drugs. Some items may interact with your medicine.  What should I watch for while using this medicine?  Tell your doctor or health care professional if your symptoms do not improve or if you get new symptoms. Drink several glasses of water a day. If you are taking this medicine for a long time, visit your doctor for regular checks on your progress.  If you are diabetic, you may get a false positive result for sugar in your urine with certain brands of urine tests. Check with your doctor.  What side effects may I notice from receiving this medicine?  Side effects that you should report to your doctor or health care professional as soon as possible:  -allergic reactions like skin rash or hives, swelling of the face, lips, or tongue  -chest pain  -cough  -difficulty breathing  -dizziness, drowsiness  -fever or infection  -joint aches or pains  -pale or blue-tinted skin  -redness, blistering, peeling or loosening of the skin, including inside the mouth  -tingling, burning, pain, or numbness in hands or feet  -unusual bleeding or bruising  -unusually weak or tired  -yellowing of eyes or skin  Side effects that usually do not require medical attention (report to your doctor or health care professional if they continue or are bothersome):  -dark  urine  -diarrhea  -headache  -loss of appetite  -nausea or vomiting  -temporary hair loss  This list may not describe all possible side effects. Call your doctor for medical advice about side effects. You may report side effects to FDA at 9-623-HTN-2281.  Where should I keep my medicine?  Keep out of the reach of children.  Store at room temperature between 15 and 30 degrees C (59 and 86 degrees F). Protect from light. Throw away any unused medicine after the expiration date.  NOTE: This sheet is a summary. It may not cover all possible information. If you have questions about this medicine, talk to your doctor, pharmacist, or health care provider.  © 2018 Elsevier/Gold Standard (2009-07-08 15:56:47)

## 2019-12-22 NOTE — PROGRESS NOTES
Discharge instructions reviewed with patient, verbalized understanding and agreement with plan. Peripheral IV removed, all belongings gathered.

## 2019-12-22 NOTE — DISCHARGE SUMMARY
Discharge Summary    CHIEF COMPLAINT ON ADMISSION  Chief Complaint   Patient presents with   • Sent by MD     kidney infection, preg with twins, and htn       Reason for Admission  Sent By MD; Flank Pain     Admission Date  12/20/2019    CODE STATUS  Full Code    HPI & HOSPITAL COURSE  24 y.o. female who is 12 weeks pregnant with twins who presented 12/20/2019 with right flank pain for the past 2 days.  She stated that she started having dysuria 4 days ago and later she started having right flank pain.she was found to have a UTI and started on abx, cultures + e.coli, ivf were started  She was monitored closely  Patient did well. White count improving, BMP normal  UDS + meth- counseled    At this time she will be transitioned to oral Nitrofurantoin and dc home, advised to fu with pcp and oB/gyne. Patient eager to go home to be with her small daughter.  At this time she is medically stable to be dc home.   She understood and agreed with above plan       Therefore, she is discharged in good and stable condition to home with close outpatient follow-up.    The patient recovered much more quickly than anticipated on admission.    Discharge Date  12/22/19    FOLLOW UP ITEMS POST DISCHARGE  pcp  OB    DISCHARGE DIAGNOSES  Principal Problem (Resolved):    Sepsis (HCC) POA: Yes  Active Problems:    Acute pyelonephritis POA: Yes    12 weeks gestation of pregnancy POA: Yes      FOLLOW UP  No future appointments.  No follow-up provider specified.    MEDICATIONS ON DISCHARGE     Medication List      START taking these medications      Instructions   nitrofurantoin monohyd macro 100 MG Caps  Commonly known as:  MACROBID   Take 1 Cap by mouth 2 times a day for 3 days.  Dose:  100 mg            Allergies  No Known Allergies    DIET  Orders Placed This Encounter   Procedures   • Diet Order Regular     Standing Status:   Standing     Number of Occurrences:   1     Order Specific Question:   Diet:     Answer:   Regular [1]        ACTIVITY  As tolerated.  Weight bearing as tolerated    CONSULTATIONS  none    PROCEDURES  none    LABORATORY  Lab Results   Component Value Date    SODIUM 133 (L) 12/22/2019    POTASSIUM 4.1 12/22/2019    CHLORIDE 105 12/22/2019    CO2 15 (L) 12/22/2019    GLUCOSE 105 (H) 12/22/2019    BUN 5 (L) 12/22/2019    CREATININE 0.52 12/22/2019        Lab Results   Component Value Date    WBC 13.3 (H) 12/22/2019    HEMOGLOBIN 13.3 12/22/2019    HEMATOCRIT 39.2 12/22/2019    PLATELETCT 274 12/22/2019        Total time of the discharge process exceeds 45 minutes.

## 2019-12-25 LAB
BACTERIA BLD CULT: NORMAL
BACTERIA BLD CULT: NORMAL
SIGNIFICANT IND 70042: NORMAL
SIGNIFICANT IND 70042: NORMAL
SITE SITE: NORMAL
SITE SITE: NORMAL
SOURCE SOURCE: NORMAL
SOURCE SOURCE: NORMAL

## 2020-05-20 ENCOUNTER — OFFICE VISIT (OUTPATIENT)
Dept: URGENT CARE | Facility: CLINIC | Age: 25
End: 2020-05-20

## 2020-05-20 VITALS
RESPIRATION RATE: 16 BRPM | DIASTOLIC BLOOD PRESSURE: 82 MMHG | HEART RATE: 130 BPM | TEMPERATURE: 98.1 F | SYSTOLIC BLOOD PRESSURE: 134 MMHG | OXYGEN SATURATION: 99 % | HEIGHT: 63 IN | BODY MASS INDEX: 21.44 KG/M2 | WEIGHT: 121 LBS

## 2020-05-20 DIAGNOSIS — R21 RASH: ICD-10-CM

## 2020-05-20 DIAGNOSIS — Z33.1 INCIDENTAL PREGNANCY: ICD-10-CM

## 2020-05-20 DIAGNOSIS — N92.6 LATE PERIOD: ICD-10-CM

## 2020-05-20 DIAGNOSIS — L30.2 ID REACTION: ICD-10-CM

## 2020-05-20 LAB
INT CON NEG: NEGATIVE
INT CON POS: POSITIVE
POC URINE PREGNANCY TEST: POSITIVE

## 2020-05-20 PROCEDURE — 81025 URINE PREGNANCY TEST: CPT | Performed by: FAMILY MEDICINE

## 2020-05-20 PROCEDURE — 99212 OFFICE O/P EST SF 10 MIN: CPT | Performed by: FAMILY MEDICINE

## 2020-05-20 RX ORDER — PRAMOXINE HYDROCHLORIDE 10 MG/ML
LOTION TOPICAL
Qty: 1 BOTTLE | Refills: 0 | Status: SHIPPED | OUTPATIENT
Start: 2020-05-20 | End: 2021-01-20

## 2020-05-20 ASSESSMENT — FIBROSIS 4 INDEX: FIB4 SCORE: 0.4

## 2020-05-20 NOTE — PROGRESS NOTES
"Subjective:      Stephanie Hoffmann is a 24 y.o. female who presents with Rash ( x1 week all over,itchy goes from blisters to scabs)      - This is a pleasant and non toxic appearing 24 y.o. female with c/o itchy rash x 1 wk. Started hands and now spread to rest of body. Nothing new she can think of except used double dose of detergent pods when doing laundry. No one else at home has same rash.     Patient unsure of LMP, thinks maybe 6 wks ago.             ALLERGIES:  Patient has no known allergies.     PMH:  Past Medical History:   Diagnosis Date   • Acute pyelonephritis 12/20/2019   • Anemia    • Anxiety    • Head ache    • Hypertension     PIH   • Substance abuse (HCC) 2018    Patient states last used 3 days ago.    • Urinary tract infection         PSH:  History reviewed. No pertinent surgical history.    MEDS:    Current Outpatient Medications:   •  Pramoxine HCl (SARNA SENSITIVE) 1 % Lotion, OTC. Use as directed, Disp: 1 Bottle, Rfl: 0    ** I have documented what I find to be significant in regards to past medical, social, family and surgical history  in my HPI or under PMH/PSH/FH review section, otherwise it is contributory **           HPI    Review of Systems   Skin: Positive for itching and rash.   All other systems reviewed and are negative.         Objective:     /82 (BP Location: Left arm)   Pulse (!) 130   Temp 36.7 °C (98.1 °F) (Temporal)   Resp 16   Ht 1.6 m (5' 3\")   Wt 54.9 kg (121 lb)   SpO2 99%   BMI 21.43 kg/m²      HR recheck 100    Physical Exam  Vitals signs and nursing note reviewed.   Constitutional:       General: She is not in acute distress.     Appearance: She is well-developed. She is not diaphoretic.   HENT:      Head: Normocephalic and atraumatic.   Eyes:      Conjunctiva/sclera: Conjunctivae normal.   Cardiovascular:      Heart sounds: Normal heart sounds. No murmur.   Pulmonary:      Effort: Pulmonary effort is normal. No respiratory distress.      Breath sounds: " Normal breath sounds.   Skin:     General: Skin is warm and dry.      Findings: Rash (fine papular/bumps (some excoriated) over hands/arms trunk face ears and legs ) present.   Neurological:      Mental Status: She is alert.      Motor: No abnormal muscle tone.   Psychiatric:         Judgment: Judgment normal.                 Assessment/Plan:           1. Rash  REFERRAL TO DERMATOLOGY    REFERRAL TO OB/GYN    Pramoxine HCl (SARNA SENSITIVE) 1 % Lotion   2. Late period  POCT Pregnancy    REFERRAL TO OB/GYN   3. Id reaction  REFERRAL TO DERMATOLOGY    REFERRAL TO OB/GYN    Pramoxine HCl (SARNA SENSITIVE) 1 % Lotion   4. Incidental pregnancy  REFERRAL TO OB/GYN     * suspect ID reaction, due to pregnancy limited on Rx meds I can use. F/u OB and derm ASAP. (scabies on differential)    - use mild soap (dove sensitive skin), cool to luke warm water only. Free n clear detergent     - E.R. precautions discussed     Dx & d/c instructions discussed w/ patient and/or family members.     Follow up with PCP (or UC if PCP is unavailable) in 2-3 days to make sure improving and no further additional treatment needed, ER if not improving or feeling/getting worse.    Any realistic and/or common medication side effects that may have been given today(i.e. Rash, GI upset/constipation, sedation, elevation of BP or blood sugars) reviewed.

## 2020-05-29 ENCOUNTER — HOSPITAL ENCOUNTER (EMERGENCY)
Facility: MEDICAL CENTER | Age: 25
End: 2020-05-29
Attending: EMERGENCY MEDICINE

## 2020-05-29 ENCOUNTER — APPOINTMENT (OUTPATIENT)
Dept: RADIOLOGY | Facility: MEDICAL CENTER | Age: 25
End: 2020-05-29
Attending: EMERGENCY MEDICINE

## 2020-05-29 VITALS
HEIGHT: 63 IN | DIASTOLIC BLOOD PRESSURE: 83 MMHG | HEART RATE: 104 BPM | WEIGHT: 123.46 LBS | TEMPERATURE: 98.2 F | BODY MASS INDEX: 21.88 KG/M2 | SYSTOLIC BLOOD PRESSURE: 142 MMHG | OXYGEN SATURATION: 99 % | RESPIRATION RATE: 16 BRPM

## 2020-05-29 DIAGNOSIS — L50.9 HIVES: ICD-10-CM

## 2020-05-29 DIAGNOSIS — O03.4 INCOMPLETE MISCARRIAGE: ICD-10-CM

## 2020-05-29 LAB
ACTION RH IMMUNE GLOB 8505RHG: NORMAL
AMPHET UR QL SCN: POSITIVE
APPEARANCE UR: ABNORMAL
APPEARANCE UR: ABNORMAL
B-HCG SERPL-ACNC: 2329 MIU/ML (ref 0–5)
BACTERIA #/AREA URNS HPF: ABNORMAL /HPF
BACTERIA #/AREA URNS HPF: ABNORMAL /HPF
BARBITURATES UR QL SCN: NEGATIVE
BENZODIAZ UR QL SCN: POSITIVE
BILIRUB UR QL STRIP.AUTO: NEGATIVE
BILIRUB UR QL STRIP.AUTO: NEGATIVE
BZE UR QL SCN: NEGATIVE
CANNABINOIDS UR QL SCN: NEGATIVE
COLOR UR: ABNORMAL
COLOR UR: ABNORMAL
EPI CELLS #/AREA URNS HPF: ABNORMAL /HPF
EPI CELLS #/AREA URNS HPF: ABNORMAL /HPF
GLUCOSE UR STRIP.AUTO-MCNC: NEGATIVE MG/DL
GLUCOSE UR STRIP.AUTO-MCNC: NEGATIVE MG/DL
HCG UR QL: POSITIVE
KETONES UR STRIP.AUTO-MCNC: NEGATIVE MG/DL
KETONES UR STRIP.AUTO-MCNC: NEGATIVE MG/DL
LEUKOCYTE ESTERASE UR QL STRIP.AUTO: ABNORMAL
LEUKOCYTE ESTERASE UR QL STRIP.AUTO: ABNORMAL
METHADONE UR QL SCN: NEGATIVE
MICRO URNS: ABNORMAL
MICRO URNS: ABNORMAL
NITRITE UR QL STRIP.AUTO: NEGATIVE
NITRITE UR QL STRIP.AUTO: NEGATIVE
NUMBER OF RH DOSES IND 8505RD: 1
OPIATES UR QL SCN: NEGATIVE
OXYCODONE UR QL SCN: NEGATIVE
PCP UR QL SCN: NEGATIVE
PH UR STRIP.AUTO: 5.5 [PH] (ref 5–8)
PH UR STRIP.AUTO: 5.5 [PH] (ref 5–8)
PROPOXYPH UR QL SCN: NEGATIVE
PROT UR QL STRIP: 100 MG/DL
PROT UR QL STRIP: 100 MG/DL
RBC # URNS HPF: >150 /HPF
RBC # URNS HPF: >150 /HPF
RBC UR QL AUTO: ABNORMAL
RBC UR QL AUTO: ABNORMAL
RH BLD: NORMAL
SP GR UR REFRACTOMETRY: 1.01
SP GR UR REFRACTOMETRY: 1.04
UROBILINOGEN UR STRIP.AUTO-MCNC: 0.2 MG/DL
UROBILINOGEN UR STRIP.AUTO-MCNC: 0.2 MG/DL
WBC #/AREA URNS HPF: ABNORMAL /HPF
WBC #/AREA URNS HPF: ABNORMAL /HPF

## 2020-05-29 PROCEDURE — 700102 HCHG RX REV CODE 250 W/ 637 OVERRIDE(OP): Performed by: EMERGENCY MEDICINE

## 2020-05-29 PROCEDURE — 81025 URINE PREGNANCY TEST: CPT

## 2020-05-29 PROCEDURE — 86901 BLOOD TYPING SEROLOGIC RH(D): CPT

## 2020-05-29 PROCEDURE — 96372 THER/PROPH/DIAG INJ SC/IM: CPT

## 2020-05-29 PROCEDURE — 76801 OB US < 14 WKS SINGLE FETUS: CPT

## 2020-05-29 PROCEDURE — 84702 CHORIONIC GONADOTROPIN TEST: CPT

## 2020-05-29 PROCEDURE — 99285 EMERGENCY DEPT VISIT HI MDM: CPT

## 2020-05-29 PROCEDURE — 80307 DRUG TEST PRSMV CHEM ANLYZR: CPT

## 2020-05-29 PROCEDURE — 700111 HCHG RX REV CODE 636 W/ 250 OVERRIDE (IP): Performed by: EMERGENCY MEDICINE

## 2020-05-29 PROCEDURE — 81001 URINALYSIS AUTO W/SCOPE: CPT

## 2020-05-29 PROCEDURE — A9270 NON-COVERED ITEM OR SERVICE: HCPCS | Performed by: EMERGENCY MEDICINE

## 2020-05-29 RX ORDER — PREDNISONE 20 MG/1
60 TABLET ORAL DAILY
Qty: 12 TAB | Refills: 0 | Status: SHIPPED | OUTPATIENT
Start: 2020-05-29 | End: 2020-06-02

## 2020-05-29 RX ORDER — PREDNISONE 20 MG/1
60 TABLET ORAL ONCE
Status: COMPLETED | OUTPATIENT
Start: 2020-05-29 | End: 2020-05-29

## 2020-05-29 RX ORDER — DIPHENHYDRAMINE HCL 25 MG
25 TABLET ORAL ONCE
Status: COMPLETED | OUTPATIENT
Start: 2020-05-29 | End: 2020-05-29

## 2020-05-29 RX ADMIN — DIPHENHYDRAMINE HYDROCHLORIDE 25 MG: 25 TABLET ORAL at 12:55

## 2020-05-29 RX ADMIN — PREDNISONE 60 MG: 20 TABLET ORAL at 12:55

## 2020-05-29 ASSESSMENT — FIBROSIS 4 INDEX: FIB4 SCORE: 0.4

## 2020-05-29 NOTE — ED PROVIDER NOTES
ED Provider Note    CHIEF COMPLAINT  Chief Complaint   Patient presents with   • Rash     Small spotted lesions to stomach/neck with swelling to face/eye   • Pregnancy     Unknown LMP/EROS       HPI  Stephanie Hoffmann is a 24 y.o. female who presents with a diffuse rash for the last 2 weeks and possible pregnancy.  Patient is somewhat difficult to get a history out of she seems to be very somnolent she admits to taking a Xanax prior to coming due to her anxiety being quite high.  The history she gives is that she has had this diffuse rash for the last 2 weeks lesions come and go at random.  They are itchy.  She denies any new lotions or creams she does not have any history of allergies.  She also reports that she thinks she is pregnant she has not had a period in 3 months.  She is not taking any pregnancy test.  She does report having some bleeding starting this morning.  She denies any other drug use.  She has been pregnant she thinks 5 or 6 times she has 1 child that is 2 years old otherwise she has had multiple miscarriages and abortions.      REVIEW OF SYSTEMS  positive for rash vaginal bleeding, negative for known allergens. All other systems are negative.     PAST MEDICAL HISTORY   has a past medical history of Acute pyelonephritis (12/20/2019), Anemia, Anxiety, Head ache, Hypertension, Substance abuse ([facility]) (2018), and Urinary tract infection.    SOCIAL HISTORY  Social History     Tobacco Use   • Smoking status: Current Every Day Smoker     Packs/day: 0.25     Years: 12.00     Pack years: 3.00     Types: Cigarettes   • Smokeless tobacco: Never Used   • Tobacco comment: Pt states smoking 4 a day.    Substance and Sexual Activity   • Alcohol use: No   • Drug use: Yes     Types: Methamphetamines, Marijuana     Comment: Pt states last used 3 days ago, marijuana now and then   • Sexual activity: Yes     Partners: Male     Comment: none       SURGICAL HISTORY  patient denies any surgical history    CURRENT  "MEDICATIONS  Home Medications     Reviewed by Schuyler B Collett, R.N. (Registered Nurse) on 05/29/20 at 1156  Med List Status: <None>   Medication Last Dose Status   Pramoxine HCl (SARNA SENSITIVE) 1 % Lotion  Active                ALLERGIES  No Known Allergies    PHYSICAL EXAM  VITAL SIGNS: /83   Pulse (!) 104   Temp 36.8 °C (98.2 °F) (Oral)   Resp 16   Ht 1.6 m (5' 3\")   Wt 56 kg (123 lb 7.3 oz)   SpO2 99%   BMI 21.87 kg/m² .  Constitutional: Alert in no apparent distress.  HENT: No signs of trauma, Bilateral external ears normal, Nose normal.  Slight swelling around the right eye  Eyes: Pupils are equal and reactive, Conjunctiva normal, Non-icteric.   Neck: Normal range of motion, No tenderness, Supple, No stridor.   Cardiovascular: Regular rate and rhythm, no murmurs.   Thorax & Lungs: Normal breath sounds, No respiratory distress, No wheezing, No chest tenderness.   Abdomen: Bowel sounds normal, Soft, No tenderness, No masses, No peritoneal signs.  Skin: Warm, Dry, No erythema, diffuse urticaria  Musculoskeletal:  no major deformities noted.   Neurologic: Alert,  No focal deficits noted.   Psychiatric: Affect normal, Judgment normal, Mood normal.       DIAGNOSTIC STUDIES / PROCEDURES        LABS  Labs Reviewed   URINALYSIS,CULTURE IF INDICATED - Abnormal; Notable for the following components:       Result Value    Character Bloody (*)     Protein 100 (*)     Leukocyte Esterase Trace (*)     Occult Blood Large (*)     All other components within normal limits   URINALYSIS,CULTURE IF INDICATED - Abnormal; Notable for the following components:    Character Cloudy (*)     Protein 100 (*)     Leukocyte Esterase Trace (*)     Occult Blood Large (*)     All other components within normal limits   HCG QUALITATIVE UR - Abnormal; Notable for the following components:    Beta-Hcg Urine Positive (*)     All other components within normal limits   URINE MICROSCOPIC (W/UA) - Abnormal; Notable for the following " components:    WBC 5-10 (*)     RBC >150 (*)     Bacteria Rare (*)     All other components within normal limits   HCG QUANTITATIVE - Abnormal; Notable for the following components:    Bhcg 2329.0 (*)     All other components within normal limits    Narrative:     Print Consent?->No   URINE MICROSCOPIC (W/UA) - Abnormal; Notable for the following components:    RBC >150 (*)     Bacteria Few (*)     All other components within normal limits   REFRACTOMETER SG   RH TYPE FOR RHOGAM FROM E.D.    Narrative:     Print Consent?->No   REFRACTOMETER SG   ACTION: RH IMMUNE GLOBULIN    Narrative:     Print Consent?->No   URINE DRUG SCREEN       RADIOLOGY  US-OB 1ST TRIMESTER WITH TRANSVAGINAL (COMBO)   Final Result      Intrauterine pregnancy with abnormal appearance of the gestational sac. Fetal pole identified with crown-rump length corresponding to an estimated gestational age of 6 weeks 3 days. No evidence of yolk sac or fetal cardiac activity. Findings are    suggestive of fetal demise. OB consultation and follow-up is recommended.              COURSE & MEDICAL DECISION MAKING  Pertinent Labs & Imaging studies reviewed. (See chart for details)  This is a 24-year-old female that presents with rash and pregnancy.  She is quite difficult to get a history from she seems altered I suspect from the Xanax that she took but I do question if there is additional drug use.  Her rash appears consistent with hives she was given Benadryl and will be given a steroid burst for 5 days.  With regards to her pregnancy she is pregnant.  Ultrasound showed an abnormal gestational sac with no fetal heart tones and no yolk sac concerning for fetal demise.  I spoke with Dr. isaac, OB who is agreeable to have the patient follow-up with him.  Patient is Rh- she will be given RhoGam.  She was given instructions to follow-up with gynecology and will be discharged.     The patient will return for new or worsening symptoms and is stable at the time of  discharge. Patient was given return precautions. Patient and/or family member verbalizes understanding and will comply.    DISPOSITION:  Patient will be discharged home in stable condition.    FOLLOW UP:  Chip Loomis M.D.  [address]  [facility] [address]  [facility] [id number] [phone]      Please call the office to follow-up with regards to your likely miscarriage.    [facility], Emergency Dept  [address] [facility] [place] [address] [phone]    Return for worsening bleeding, bleeding greater than 2 pads an hour worsening pain or other concerns.      OUTPATIENT MEDICATIONS:  Discharge Medication List as of 5/29/2020  3:32 PM      START taking these medications    Details   predniSONE (DELTASONE) 20 MG Tab Take 3 Tabs by mouth every day for 4 days., Disp-12 Tab,R-0, Print Rx Paper                 FINAL IMPRESSION  1. Incomplete miscarriage     2. Hives         2.   3.    This dictation has been creating using voice recognition software. The accuracy of the dictation is limited the abilities of the software.  I expect there may be some errors of grammar and possibly content. I made every attempt to manually correct the errors within my dictation. However errors related to this voice recognition software may still exist and should be interpreted within the appropriate context.      The note accurately reflects work and decisions made by me.  Rashmi Sales M.D.  5/29/2020  6:29 PM

## 2020-05-29 NOTE — ED NOTES
"Pt c/o rash covering most of body x 2 weeks. Denies any changes to soap/lotions/food etc. Small, raised rash noted to bilateral upper and lower extremities, trunk. Swelling noted to R eye and lips. Pt states eye and lip swelling has improved since yesterday.     Pt also states she is pregnant, unknown gestation. Unknown LMP, states \"maybe two months.\" c/o vaginal bleeding.     Pt a/o x4, speaking in full sentences.   "

## 2020-05-29 NOTE — ED TRIAGE NOTES
Stephanie Hoffmann presents to triage reporting   Chief Complaint   Patient presents with   • Rash     Small spotted lesions to stomach/neck with swelling to face/eye   • Pregnancy     Unknown LMP/EROS     Pt reports recent unprotected sex.     Pt speaking in full sentences, NAD noted. Pt educated of triage process, placed back in waiting area pending room assignment.

## 2021-01-20 ENCOUNTER — HOSPITAL ENCOUNTER (EMERGENCY)
Facility: MEDICAL CENTER | Age: 26
End: 2021-01-20
Attending: EMERGENCY MEDICINE | Admitting: EMERGENCY MEDICINE
Payer: MEDICAID

## 2021-01-20 VITALS
RESPIRATION RATE: 18 BRPM | SYSTOLIC BLOOD PRESSURE: 177 MMHG | WEIGHT: 120 LBS | OXYGEN SATURATION: 98 % | DIASTOLIC BLOOD PRESSURE: 100 MMHG | HEIGHT: 62 IN | TEMPERATURE: 98 F | BODY MASS INDEX: 22.08 KG/M2 | HEART RATE: 80 BPM

## 2021-01-20 DIAGNOSIS — I10 HYPERTENSION, UNSPECIFIED TYPE: ICD-10-CM

## 2021-01-20 PROCEDURE — 700102 HCHG RX REV CODE 250 W/ 637 OVERRIDE(OP): Performed by: EMERGENCY MEDICINE

## 2021-01-20 PROCEDURE — 99283 EMERGENCY DEPT VISIT LOW MDM: CPT

## 2021-01-20 PROCEDURE — A9270 NON-COVERED ITEM OR SERVICE: HCPCS | Performed by: EMERGENCY MEDICINE

## 2021-01-20 RX ORDER — LISINOPRIL 10 MG/1
10 TABLET ORAL DAILY
Qty: 30 TAB | Refills: 0 | Status: SHIPPED | OUTPATIENT
Start: 2021-01-20 | End: 2021-02-05 | Stop reason: SDUPTHER

## 2021-01-20 RX ORDER — CLONIDINE HYDROCHLORIDE 0.1 MG/1
0.1 TABLET ORAL ONCE
Status: COMPLETED | OUTPATIENT
Start: 2021-01-20 | End: 2021-01-20

## 2021-01-20 RX ADMIN — CLONIDINE HYDROCHLORIDE 0.1 MG: 0.1 TABLET ORAL at 19:35

## 2021-01-20 ASSESSMENT — FIBROSIS 4 INDEX: FIB4 SCORE: 0.42

## 2021-01-21 NOTE — ED NOTES
Pt resistant to take b/p med @ this time. Pt states she didn't fill her RX back in 2017 because she states she didn't want to take the meds.

## 2021-01-21 NOTE — ED TRIAGE NOTES
Pt here via [facility] pt has forensic restraints on right wrist. Pt hasno c/o pt was being checked into the detention and during v/s check found to have HTN. Pt states she was diagnosed with it in 2017 and has RXs written for her but she never picked them up. A&oX4 GCS=15. Pt here for medical clearence to go back to detention

## 2021-01-21 NOTE — ED PROVIDER NOTES
ED Provider Note    CHIEF COMPLAINT  Chief Complaint   Patient presents with   • Hypertension       HPI  Stephanie Hoffmann is a 25 y.o. female who presents handcuffed to the bed, apparently brought here by [facility]'s deputies.  Patient states she was to go to halfway tonight for some type of booking, she is unsure.  She stated she told she would be released later this evening.  Patient has been told she has high blood pressure in the past, both on previous visit to the emergency department as well as after a pregnancy.  She states she has been reluctant to take any type of medications.  No chest pain, no headache.  Her blood pressure was too high at the halfway, therefore sent to the emergency department for evaluation and medical clearance.  Chart states history of substance abuse, last use of unknown drug reported as 3 days ago per the chart    REVIEW OF SYSTEMS  Constitutional: Denies fever or dizziness  Respiratory: No shortness of breath  Cardiac no chest pain  Neurologic: No headache  Musculoskeletal: No back pain    PAST MEDICAL HISTORY  Past Medical History:   Diagnosis Date   • Acute pyelonephritis 12/20/2019   • Anemia    • Anxiety    • Head ache    • Hypertension     PIH   • Substance abuse (HCC) 2018    Patient states last used 3 days ago.    • Urinary tract infection        FAMILY HISTORY  Family History   Problem Relation Age of Onset   • No Known Problems Mother    • No Known Problems Father    • No Known Problems Sister    • Diabetes Brother    • Thyroid Brother        SOCIAL HISTORY  Social History     Socioeconomic History   • Marital status: Single     Spouse name: Not on file   • Number of children: Not on file   • Years of education: Not on file   • Highest education level: Not on file   Occupational History   • Not on file   Social Needs   • Financial resource strain: Not on file   • Food insecurity     Worry: Not on file     Inability: Not on file   • Transportation needs     Medical: Not on file  "    Non-medical: Not on file   Tobacco Use   • Smoking status: Current Every Day Smoker     Packs/day: 0.25     Years: 12.00     Pack years: 3.00     Types: Cigarettes   • Smokeless tobacco: Never Used   • Tobacco comment: Pt states smoking 4 a day.    Substance and Sexual Activity   • Alcohol use: No   • Drug use: Yes     Types: Methamphetamines, Marijuana     Comment: Pt states last used 3 days ago, marijuana now and then   • Sexual activity: Yes     Partners: Male     Comment: none   Lifestyle   • Physical activity     Days per week: Not on file     Minutes per session: Not on file   • Stress: Not on file   Relationships   • Social connections     Talks on phone: Not on file     Gets together: Not on file     Attends [employer] service: Not on file     Active member of club or organization: Not on file     Attends meetings of clubs or organizations: Not on file     Relationship status: Not on file   • Intimate partner violence     Fear of current or ex partner: Not on file     Emotionally abused: Not on file     Physically abused: Not on file     Forced sexual activity: Not on file   Other Topics Concern   • Not on file   Social History Narrative   • Not on file       SURGICAL HISTORY  No past surgical history on file.    CURRENT MEDICATIONS  No current facility-administered medications on file prior to encounter.      No current outpatient medications on file prior to encounter.       ALLERGIES  No Known Allergies    PHYSICAL EXAM  VITAL SIGNS: BP (!) 186/116   Pulse 82   Temp 36.8 °C (98.2 °F)   Resp (!) 22   Ht 1.575 m (5' 2\")   Wt 54.4 kg (120 lb)   SpO2 97%   BMI 21.95 kg/m²   Constitutional:  Well nourished, No acute distress.   HENT: Face is atraumatic, no epistaxis  Eyes:  Conjunctiva normal, No discharge.    Cardiovascular: The heart is regular rhythm, normal rate  Pulmonary: Lungs are clear, good air movement, no wheezing or rales  Skin: No cyanosis.   Musculoskeletal: Neck nontender   Neurologic: " speech is clear, no ataxia.  Sensation intact  Psychiatric:  Mood slightly angry at times.  Cooperative      COURSE & MEDICAL DECISION MAKING  Pertinent Labs & Imaging studies reviewed. (See chart for details)  Patient received 0.1 mg of clonidine, she had initially refused any medication.  Patient is prescribed lisinopril 10 mg to be used daily.  She is advised to obtain blood pressure cuff to measure daily blood pressures and keep a log for her primary doctor.  Repeat blood pressure had not improved, patient is refusing further dose of medication.  She did excepted the prescription for lisinopril.  Patient is advised to follow-up with primary doctor soon as possible.    FINAL IMPRESSION     1. Hypertension, unspecified type                  Electronically signed by: eBthel Rodriguez M.D., 1/20/2021 8:09 PM

## 2021-01-21 NOTE — DISCHARGE INSTRUCTIONS
Follow-up with primary doctor for recheck.  Obtain blood pressure cuff from store to check your blood pressure daily, please write down the number to keep a log.  Take your medication daily, goal blood pressure 135/85 or less.  Stop medication for severe dizziness, or blood pressure less than 90/60.    Return for any concerns.          Medically clear for pen[facility]entiary

## 2021-01-21 NOTE — ED NOTES
Pt refusing further doses of antihypertensives @ this time. Per ERP pt can be d/c into PD custody @ this time with return instructions for any associated symptoms. D/c instructions and RX given to officers on d/c pt instructed to take her meds as directed

## 2021-02-03 ENCOUNTER — APPOINTMENT (OUTPATIENT)
Dept: RADIOLOGY | Facility: MEDICAL CENTER | Age: 26
End: 2021-02-03
Attending: EMERGENCY MEDICINE
Payer: MEDICAID

## 2021-02-03 ENCOUNTER — HOSPITAL ENCOUNTER (EMERGENCY)
Facility: MEDICAL CENTER | Age: 26
End: 2021-02-03
Attending: EMERGENCY MEDICINE | Admitting: EMERGENCY MEDICINE
Payer: MEDICAID

## 2021-02-03 VITALS
HEART RATE: 110 BPM | WEIGHT: 120 LBS | HEIGHT: 63 IN | OXYGEN SATURATION: 99 % | SYSTOLIC BLOOD PRESSURE: 136 MMHG | BODY MASS INDEX: 21.26 KG/M2 | DIASTOLIC BLOOD PRESSURE: 84 MMHG | RESPIRATION RATE: 29 BRPM | TEMPERATURE: 99.9 F

## 2021-02-03 DIAGNOSIS — R50.9 FEVER, UNSPECIFIED FEVER CAUSE: ICD-10-CM

## 2021-02-03 DIAGNOSIS — N10 ACUTE PYELONEPHRITIS: ICD-10-CM

## 2021-02-03 LAB
ALBUMIN SERPL BCP-MCNC: 4.2 G/DL (ref 3.2–4.9)
ALBUMIN/GLOB SERPL: 1.3 G/DL
ALP SERPL-CCNC: 83 U/L (ref 30–99)
ALT SERPL-CCNC: 11 U/L (ref 2–50)
ANION GAP SERPL CALC-SCNC: 12 MMOL/L (ref 7–16)
APPEARANCE UR: ABNORMAL
AST SERPL-CCNC: 14 U/L (ref 12–45)
BACTERIA #/AREA URNS HPF: ABNORMAL /HPF
BASOPHILS # BLD AUTO: 0.1 % (ref 0–1.8)
BASOPHILS # BLD: 0.02 K/UL (ref 0–0.12)
BILIRUB SERPL-MCNC: 0.9 MG/DL (ref 0.1–1.5)
BILIRUB UR QL STRIP.AUTO: NEGATIVE
BUN SERPL-MCNC: 8 MG/DL (ref 8–22)
CALCIUM SERPL-MCNC: 9.1 MG/DL (ref 8.5–10.5)
CHLORIDE SERPL-SCNC: 99 MMOL/L (ref 96–112)
CO2 SERPL-SCNC: 20 MMOL/L (ref 20–33)
COLOR UR: YELLOW
CREAT SERPL-MCNC: 0.68 MG/DL (ref 0.5–1.4)
EKG IMPRESSION: NORMAL
EOSINOPHIL # BLD AUTO: 0.01 K/UL (ref 0–0.51)
EOSINOPHIL NFR BLD: 0.1 % (ref 0–6.9)
EPI CELLS #/AREA URNS HPF: ABNORMAL /HPF
ERYTHROCYTE [DISTWIDTH] IN BLOOD BY AUTOMATED COUNT: 45.9 FL (ref 35.9–50)
GLOBULIN SER CALC-MCNC: 3.2 G/DL (ref 1.9–3.5)
GLUCOSE SERPL-MCNC: 106 MG/DL (ref 65–99)
GLUCOSE UR STRIP.AUTO-MCNC: NEGATIVE MG/DL
HCG SERPL QL: NEGATIVE
HCT VFR BLD AUTO: 43.1 % (ref 37–47)
HGB BLD-MCNC: 14.6 G/DL (ref 12–16)
HYALINE CASTS #/AREA URNS LPF: ABNORMAL /LPF
IMM GRANULOCYTES # BLD AUTO: 0.1 K/UL (ref 0–0.11)
IMM GRANULOCYTES NFR BLD AUTO: 0.6 % (ref 0–0.9)
KETONES UR STRIP.AUTO-MCNC: 15 MG/DL
LACTATE BLD-SCNC: 1.2 MMOL/L (ref 0.5–2)
LACTATE BLD-SCNC: 1.4 MMOL/L (ref 0.5–2)
LEUKOCYTE ESTERASE UR QL STRIP.AUTO: ABNORMAL
LYMPHOCYTES # BLD AUTO: 2.12 K/UL (ref 1–4.8)
LYMPHOCYTES NFR BLD: 11.8 % (ref 22–41)
MCH RBC QN AUTO: 31.3 PG (ref 27–33)
MCHC RBC AUTO-ENTMCNC: 33.9 G/DL (ref 33.6–35)
MCV RBC AUTO: 92.5 FL (ref 81.4–97.8)
MICRO URNS: ABNORMAL
MONOCYTES # BLD AUTO: 1.9 K/UL (ref 0–0.85)
MONOCYTES NFR BLD AUTO: 10.6 % (ref 0–13.4)
NEUTROPHILS # BLD AUTO: 13.78 K/UL (ref 2–7.15)
NEUTROPHILS NFR BLD: 76.8 % (ref 44–72)
NITRITE UR QL STRIP.AUTO: NEGATIVE
NRBC # BLD AUTO: 0 K/UL
NRBC BLD-RTO: 0 /100 WBC
PH UR STRIP.AUTO: 5.5 [PH] (ref 5–8)
PLATELET # BLD AUTO: 334 K/UL (ref 164–446)
PMV BLD AUTO: 9.2 FL (ref 9–12.9)
POTASSIUM SERPL-SCNC: 4 MMOL/L (ref 3.6–5.5)
PROT SERPL-MCNC: 7.4 G/DL (ref 6–8.2)
PROT UR QL STRIP: 100 MG/DL
RBC # BLD AUTO: 4.66 M/UL (ref 4.2–5.4)
RBC # URNS HPF: ABNORMAL /HPF
RBC UR QL AUTO: ABNORMAL
SODIUM SERPL-SCNC: 131 MMOL/L (ref 135–145)
SP GR UR STRIP.AUTO: 1.02
UROBILINOGEN UR STRIP.AUTO-MCNC: 1 MG/DL
WBC # BLD AUTO: 17.9 K/UL (ref 4.8–10.8)
WBC #/AREA URNS HPF: ABNORMAL /HPF

## 2021-02-03 PROCEDURE — U0005 INFEC AGEN DETEC AMPLI PROBE: HCPCS

## 2021-02-03 PROCEDURE — 83605 ASSAY OF LACTIC ACID: CPT

## 2021-02-03 PROCEDURE — 87186 SC STD MICRODIL/AGAR DIL: CPT

## 2021-02-03 PROCEDURE — 96365 THER/PROPH/DIAG IV INF INIT: CPT

## 2021-02-03 PROCEDURE — 87077 CULTURE AEROBIC IDENTIFY: CPT

## 2021-02-03 PROCEDURE — 80053 COMPREHEN METABOLIC PANEL: CPT

## 2021-02-03 PROCEDURE — 84703 CHORIONIC GONADOTROPIN ASSAY: CPT

## 2021-02-03 PROCEDURE — 700105 HCHG RX REV CODE 258: Performed by: EMERGENCY MEDICINE

## 2021-02-03 PROCEDURE — 81001 URINALYSIS AUTO W/SCOPE: CPT

## 2021-02-03 PROCEDURE — 700111 HCHG RX REV CODE 636 W/ 250 OVERRIDE (IP): Performed by: EMERGENCY MEDICINE

## 2021-02-03 PROCEDURE — 87086 URINE CULTURE/COLONY COUNT: CPT

## 2021-02-03 PROCEDURE — 87040 BLOOD CULTURE FOR BACTERIA: CPT

## 2021-02-03 PROCEDURE — 85025 COMPLETE CBC W/AUTO DIFF WBC: CPT

## 2021-02-03 PROCEDURE — 700102 HCHG RX REV CODE 250 W/ 637 OVERRIDE(OP): Performed by: EMERGENCY MEDICINE

## 2021-02-03 PROCEDURE — 96375 TX/PRO/DX INJ NEW DRUG ADDON: CPT

## 2021-02-03 PROCEDURE — 99284 EMERGENCY DEPT VISIT MOD MDM: CPT

## 2021-02-03 PROCEDURE — 71045 X-RAY EXAM CHEST 1 VIEW: CPT

## 2021-02-03 PROCEDURE — U0003 INFECTIOUS AGENT DETECTION BY NUCLEIC ACID (DNA OR RNA); SEVERE ACUTE RESPIRATORY SYNDROME CORONAVIRUS 2 (SARS-COV-2) (CORONAVIRUS DISEASE [COVID-19]), AMPLIFIED PROBE TECHNIQUE, MAKING USE OF HIGH THROUGHPUT TECHNOLOGIES AS DESCRIBED BY CMS-2020-01-R: HCPCS

## 2021-02-03 PROCEDURE — A9270 NON-COVERED ITEM OR SERVICE: HCPCS | Performed by: EMERGENCY MEDICINE

## 2021-02-03 PROCEDURE — 93005 ELECTROCARDIOGRAM TRACING: CPT | Performed by: EMERGENCY MEDICINE

## 2021-02-03 RX ORDER — ACETAMINOPHEN 325 MG/1
650 TABLET ORAL ONCE
Status: COMPLETED | OUTPATIENT
Start: 2021-02-03 | End: 2021-02-03

## 2021-02-03 RX ORDER — SODIUM CHLORIDE 9 MG/ML
1000 INJECTION, SOLUTION INTRAVENOUS ONCE
Status: COMPLETED | OUTPATIENT
Start: 2021-02-03 | End: 2021-02-03

## 2021-02-03 RX ORDER — SULFAMETHOXAZOLE AND TRIMETHOPRIM 800; 160 MG/1; MG/1
1 TABLET ORAL 2 TIMES DAILY
Qty: 20 TAB | Refills: 0 | Status: SHIPPED | OUTPATIENT
Start: 2021-02-03 | End: 2021-02-05 | Stop reason: SDUPTHER

## 2021-02-03 RX ORDER — KETOROLAC TROMETHAMINE 30 MG/ML
30 INJECTION, SOLUTION INTRAMUSCULAR; INTRAVENOUS ONCE
Status: COMPLETED | OUTPATIENT
Start: 2021-02-03 | End: 2021-02-03

## 2021-02-03 RX ADMIN — SODIUM CHLORIDE 1000 ML: 9 INJECTION, SOLUTION INTRAVENOUS at 19:36

## 2021-02-03 RX ADMIN — SODIUM CHLORIDE 1000 ML: 9 INJECTION, SOLUTION INTRAVENOUS at 21:58

## 2021-02-03 RX ADMIN — ACETAMINOPHEN 650 MG: 325 TABLET, FILM COATED ORAL at 19:36

## 2021-02-03 RX ADMIN — KETOROLAC TROMETHAMINE 30 MG: 30 INJECTION, SOLUTION INTRAMUSCULAR at 21:12

## 2021-02-03 RX ADMIN — CEFTRIAXONE SODIUM 1 G: 1 INJECTION, POWDER, FOR SOLUTION INTRAMUSCULAR; INTRAVENOUS at 22:49

## 2021-02-03 ASSESSMENT — FIBROSIS 4 INDEX: FIB4 SCORE: 0.42

## 2021-02-03 NOTE — LETTER
2/6/2021               Stephanie Hoffmann  [address] [address]        Dear Stephanie (MR#[id number])    As we have been unable to contact you by phone, this letter is sent in regards to your recent visit to the [facility] Emergency Department on 2/3/2021.  During the visit, tests were performed to assist the physician in a medical diagnosis.  A review of those tests requires that we notify you of the following:    Your urine culture and sensitivity was POSITIVE for a bacteria called Escherichia coli, and further treatment may be necessary. The currently prescribed antibiotic (sulfamethoxazole-trimethoprim) may not be effective in treating your infection. IF YOU ARE NOT FEELING BETTER PLEASE CONTACT ME AS SOON AS POSSIBLE AT THE NUMBER BELOW.       Thank you for your cooperation in the matter.    Sincerely,  ED Culture Follow-Up Staff  Dami Villar, PharmD, PharmD    [facility] [place], Emergency Department  [facility] [place], [place] [address]  [phone] (ED Culture Line)  [phone]

## 2021-02-04 LAB
SARS-COV-2 RNA RESP QL NAA+PROBE: NOTDETECTED
SPECIMEN SOURCE: NORMAL

## 2021-02-04 NOTE — ED PROVIDER NOTES
ED Provider Note    CHIEF COMPLAINT  Chief Complaint   Patient presents with   • Fever     Fever started today, sent in by family because mother is immunocompromised   • Headache   • Hypertension     has not filled script       HPI  Stephanie Hoffmann is a 25 y.o. female who presents for evaluation of generalized body aches, fever and a headache, this all began today.  She comes in by ambulance reporting that her mother is ill and immunocompromise and she has to be ruled out for Covid.  She has no chest pain or shortness of breath or coughing.  She denies abdominal pain, nausea, vomiting or diarrhea.  No rash.  Her medical history is significant for methamphetamine use most recently yesterday as well as hypertension, she denies compliance with her hypertension medication.  No burning or blood with urination.    REVIEW OF SYSTEMS  Negative for rash, chest pain, dyspnea, abdominal pain, nausea, vomiting, diarrhea, focal weakness, focal numbness, focal tingling. All other systems are negative.     PAST MEDICAL HISTORY  Past Medical History:   Diagnosis Date   • Acute pyelonephritis 12/20/2019   • Anemia    • Anxiety    • Head ache    • Hypertension     PIH   • Substance abuse (HCC) 2018    Patient states last used 3 days ago.    • Urinary tract infection        FAMILY HISTORY  Family History   Problem Relation Age of Onset   • No Known Problems Mother    • No Known Problems Father    • No Known Problems Sister    • Diabetes Brother    • Thyroid Brother        SOCIAL HISTORY  Social History     Tobacco Use   • Smoking status: Current Every Day Smoker     Packs/day: 0.25     Years: 12.00     Pack years: 3.00     Types: Cigarettes   • Smokeless tobacco: Never Used   • Tobacco comment: Pt states smoking 4 a day.    Substance Use Topics   • Alcohol use: No   • Drug use: Yes     Types: Methamphetamines, Marijuana     Comment: Pt states last used 3 days ago, marijuana now and then       SURGICAL HISTORY  No past surgical  "history on file.    CURRENT MEDICATIONS  I personally reviewed the medication list in the charting documentation.     ALLERGIES  No Known Allergies    MEDICAL RECORD  I have reviewed patient's medical record and pertinent results are listed above.      PHYSICAL EXAM  VITAL SIGNS: /80   Pulse (!) 126   Temp (!) 38.5 °C (101.3 °F) (Oral)   Resp 14   Ht 1.6 m (5' 3\")   Wt 54.4 kg (120 lb)   SpO2 96%   BMI 21.26 kg/m²    Constitutional: Well appearing patient in no acute distress.  Not toxic, nor ill in appearance.  HENT: Normocephalic, no evidence of acute trauma.  Eyes: No scleral icterus. Normal conjunctiva   Neck: Supple, comfortable, nonpainful range of motion.   Cardiovascular: Tachycardic, regular  Thorax & Lungs: Chest is nontender.  Lungs are clear to auscultation with good air movement bilaterally.  No wheeze, rhonchi, nor rales.   Abdomen: Soft, with no tenderness, rebound nor guarding.  No mass or pulsatile mass appreciated.  Skin: Warm, dry. No rash appreciated  Extremities/Musculoskeletal: No sign of trauma. No asymmetric calf tenderness, erythema or edema. Normal range of motion   Neurologic: Alert & oriented. No focal deficits observed.   Psychiatric: Normal affect appropriate for the clinical situation.    DIAGNOSTIC STUDIES / PROCEDURES    LABS/EKGs  Results for orders placed or performed during the hospital encounter of 02/03/21   CBC WITH DIFFERENTIAL   Result Value Ref Range    WBC 17.9 (H) 4.8 - 10.8 K/uL    RBC 4.66 4.20 - 5.40 M/uL    Hemoglobin 14.6 12.0 - 16.0 g/dL    Hematocrit 43.1 37.0 - 47.0 %    MCV 92.5 81.4 - 97.8 fL    MCH 31.3 27.0 - 33.0 pg    MCHC 33.9 33.6 - 35.0 g/dL    RDW 45.9 35.9 - 50.0 fL    Platelet Count 334 164 - 446 K/uL    MPV 9.2 9.0 - 12.9 fL    Neutrophils-Polys 76.80 (H) 44.00 - 72.00 %    Lymphocytes 11.80 (L) 22.00 - 41.00 %    Monocytes 10.60 0.00 - 13.40 %    Eosinophils 0.10 0.00 - 6.90 %    Basophils 0.10 0.00 - 1.80 %    Immature Granulocytes 0.60 " 0.00 - 0.90 %    Nucleated RBC 0.00 /100 WBC    Neutrophils (Absolute) 13.78 (H) 2.00 - 7.15 K/uL    Lymphs (Absolute) 2.12 1.00 - 4.80 K/uL    Monos (Absolute) 1.90 (H) 0.00 - 0.85 K/uL    Eos (Absolute) 0.01 0.00 - 0.51 K/uL    Baso (Absolute) 0.02 0.00 - 0.12 K/uL    Immature Granulocytes (abs) 0.10 0.00 - 0.11 K/uL    NRBC (Absolute) 0.00 K/uL   COMP METABOLIC PANEL   Result Value Ref Range    Sodium 131 (L) 135 - 145 mmol/L    Potassium 4.0 3.6 - 5.5 mmol/L    Chloride 99 96 - 112 mmol/L    Co2 20 20 - 33 mmol/L    Anion Gap 12.0 7.0 - 16.0    Glucose 106 (H) 65 - 99 mg/dL    Bun 8 8 - 22 mg/dL    Creatinine 0.68 0.50 - 1.40 mg/dL    Calcium 9.1 8.5 - 10.5 mg/dL    AST(SGOT) 14 12 - 45 U/L    ALT(SGPT) 11 2 - 50 U/L    Alkaline Phosphatase 83 30 - 99 U/L    Total Bilirubin 0.9 0.1 - 1.5 mg/dL    Albumin 4.2 3.2 - 4.9 g/dL    Total Protein 7.4 6.0 - 8.2 g/dL    Globulin 3.2 1.9 - 3.5 g/dL    A-G Ratio 1.3 g/dL   LACTIC ACID   Result Value Ref Range    Lactic Acid 1.2 0.5 - 2.0 mmol/L   URINALYSIS    Specimen: Urine   Result Value Ref Range    Color Yellow     Character Turbid (A)     Specific Gravity 1.024 <1.035    Ph 5.5 5.0 - 8.0    Glucose Negative Negative mg/dL    Ketones 15 (A) Negative mg/dL    Protein 100 (A) Negative mg/dL    Bilirubin Negative Negative    Urobilinogen, Urine 1.0 Negative    Nitrite Negative Negative    Leukocyte Esterase Large (A) Negative    Occult Blood Moderate (A) Negative    Micro Urine Req Microscopic    SARS-CoV-2 PCR (24 hour In-House): Collect NP swab in [facility]    Specimen: Respirate   Result Value Ref Range    SARS-CoV-2 Source NP Swab    BETA-HCG QUALITATIVE SERUM   Result Value Ref Range    Beta-Hcg Qualitative Serum Negative Negative   LACTIC ACID   Result Value Ref Range    Lactic Acid 1.4 0.5 - 2.0 mmol/L   ESTIMATED GFR   Result Value Ref Range    GFR If African American >60 >60 mL/min/1.73 m 2    GFR If Non African American >60 >60 mL/min/1.73 m 2   URINE MICROSCOPIC  (W/UA)   Result Value Ref Range    WBC Packed (A) /hpf    RBC 10-20 (A) /hpf    Bacteria Many (A) None /hpf    Epithelial Cells Many (A) /hpf    Hyaline Cast 3-5 (A) /lpf   EKG   Result Value Ref Range    Report       [facility] Emergency Dept.    Test Date:  2021  Pt Name:    KELSI CLARK              Department: ER  MRN:        [id number]                      Room:       [facility]  Gender:     Female                       Technician: [id number]  :        [id number]                   Requested By:DOMENICA ARTEAGA  Order #:    [id number]                    Reading MD: DOMENICA ARTEAGA MD    Measurements  Intervals                                Axis  Rate:       122                          P:          0  UT:         158                          QRS:        99  QRSD:       88                           T:          36  QT:         332  QTc:        473    Interpretive Statements  12 Lead EKG interpreted by me to show: -- Rate 122 -- Rhythm: Sinus  tachycardia  -- Axis: Normal -- UT and QRS Intervals: Normal -- T waves: No acute changes  --  ST segments: No acute changes -- Ectopy: None. My impression of this EKG:  Does  not indicate acute ischemia at this time.  Electronically Diya d On 2-3-2021 22:28:18 PST by DOMENICA ARTEAGA MD          RADIOLOGY  DX-CHEST-PORTABLE (1 VIEW)   Final Result      No acute cardiopulmonary abnormality.            COURSE & MEDICAL DECISION MAKING  I have reviewed any medical record information, laboratory studies and radiographic results as noted above.  Differential diagnoses includes: Sepsis, COVID-19, dehydration, likely abnormalities, anemia, pneumonia, UTI    Encounter Summary: This is a 25 y.o. female with fever, generalized body aches and headache over the past 24 hours, presents febrile and tachycardic although she has a history of methamphetamine abuse most recently used yesterday.  She has not had any nausea vomiting or diarrhea, no abdominal pain and has a  benign abdominal examination.  She is not hypoxic.  Because of her tachycardia and fever she will be initiated on a septic work-up, will administer some Toradol and Tylenol as well as some IV fluids, she will be reevaluated ------ WBC is significantly elevated.  Urinalysis is consistent with infection, given her complaints of back pain I am concerned this could represent pyelonephritis.  The patient also has her pending Covid swab which could be the etiology of her fever as well.  Overall, she remains tachycardic but improved, I do suspect that is much more likely related to her methamphetamine use as opposed to sepsis although she does meet multiple markers of sepsis.  Overall she is healthy, she is young, she is ambulating around the department independently to use the restroom, I think she appears well and is appropriate to be discharged despite her ongoing tachycardia.  She will be prescribed a 10-day course of Bactrim for her pyelonephritis.  She is also given instructions to quarantine herself until she receives her Covid test results sometime tomorrow.  Strict return instructions have been discussed    DISPOSITION: Discharged home in stable condition      FINAL IMPRESSION  1. Acute pyelonephritis    2. Fever, unspecified fever cause           This dictation was created using voice recognition software. The accuracy of the dictation is limited to the abilities of the software. I expect there may be some errors of grammar and possibly content. The nursing notes were reviewed and certain aspects of this information were incorporated into this note.    Electronically signed by: Jez Newman M.D., 2/3/2021 7:17 PM

## 2021-02-04 NOTE — ED TRIAGE NOTES
BIB ems for fever and headache. Reports parents called 911 because mother is immunocompromised. Pt endorses meth use in last 24 to 48 hours.

## 2021-02-04 NOTE — ED NOTES
Assumed care from ZENAIDA Sun. Pt c/o headache, chills. Tylenol provided. Unable to draw 2nd blood culture. Warm blankets provided.

## 2021-02-04 NOTE — DISCHARGE INSTRUCTIONS
You have been tested for COVID-19 today.  Your results are pending.  Until you receive the results, it is very important that you act as though you are positive and self isolate.  Make sure you continue to wear your mask and practice good social distancing and proper hand hygiene regardless of the result.  You will be able to receive your results as soon as they are available by logging onto your MyChart.

## 2021-02-05 RX ORDER — SULFAMETHOXAZOLE AND TRIMETHOPRIM 800; 160 MG/1; MG/1
1 TABLET ORAL 2 TIMES DAILY
Qty: 20 TAB | Refills: 0 | Status: SHIPPED | OUTPATIENT
Start: 2021-02-05 | End: 2021-02-15

## 2021-02-05 RX ORDER — LISINOPRIL 10 MG/1
10 TABLET ORAL DAILY
Qty: 30 TAB | Refills: 0 | Status: ON HOLD | OUTPATIENT
Start: 2021-02-05 | End: 2021-06-21

## 2021-02-05 NOTE — DISCHARGE PLANNING
Received call from pt requesting that we send her prescriptions to [facility] pharmacy.  Prescriptions were sent to [facility] and she states her insurance won't cover them.  Spoke with Dr. Recinos and he sent scripts to [facility] on [address] as requested by pt.

## 2021-02-06 LAB
BACTERIA UR CULT: ABNORMAL
BACTERIA UR CULT: ABNORMAL
SIGNIFICANT IND 70042: ABNORMAL
SITE SITE: ABNORMAL
SOURCE SOURCE: ABNORMAL

## 2021-02-06 NOTE — ED NOTES
ED Positive Culture Follow-up/Notification Note:   Date: 2/6/2021    Patient seen in the ED on 2/3/2021 for fever, headache, tachycardia, back pain.   1. Acute pyelonephritis    2. Fever, unspecified fever cause      Discharge Medication List as of 2/3/2021 11:44 PM      START taking these medications    Details   sulfamethoxazole-trimethoprim (BACTRIM DS) 800-160 MG tablet Take 1 Tab by mouth 2 times a day for 10 days., Disp-20 Tab, R-0, Normal           Ceftriaxone 1g IV x1 given in ED     Allergies: Patient has no known allergies.    Vitals:    02/03/21 2100 02/03/21 2123 02/03/21 2200 02/03/21 2300   BP: 146/95  150/91 136/84   Pulse: (!) 129  (!) 121 (!) 110   Resp:   (!) 30 (!) 29   Temp:  37.7 °C (99.9 °F)     TempSrc:  Oral     SpO2: 96%  98% 99%   Weight:       Height:           Final cultures:   Results     Procedure Component Value Units Date/Time    URINE CULTURE(NEW) [[id number]]  (Abnormal)  (Susceptibility) Collected: 02/03/21 2110    Order Status: Completed Specimen: Urine Updated: 02/06/21 0939     Significant Indicator POS     Source UR     Site -     Culture Result -      Escherichia coli  >100,000 cfu/mL      Narrative:      Indication for culture:->Evaluation for sepsis without a  clear source of infection  Indication for culture:->Evaluation for sepsis without a    Susceptibility     Escherichia coli (1)     Antibiotic Interpretation Microscan Method Status    Amikacin [*]  Sensitive <=16 mcg/mL PRECIOUS Final    Ampicillin Resistant >16 mcg/mL PRECIOUS Final    Amoxicillin/CA [*]  Sensitive <=8/4 mcg/mL PRECIOUS Final    Aztreonam [*]  Sensitive <=4 mcg/mL PRECIOUS Final    Ceftolozane+Tazobactam [*]  Sensitive <=2 mcg/mL PRECIOUS Final    Ceftriaxone Sensitive <=1 mcg/mL PRECIOUS Final    Ceftazidime Sensitive <=1 mcg/mL PRECIOUS Final    Cefotaxime Sensitive <=2 mcg/mL PRECIOUS Final    Cefazolin Sensitive <=2 mcg/mL PRECIOUS Final    Ciprofloxacin Sensitive <=0.25 mcg/mL PRECIOUS Final    Cefepime Sensitive <=2 mcg/mL PRECIOUS Final     "Cefuroxime Sensitive <=4 mcg/mL PRECIOUS Final    Ampicillin/sulbactam Intermediate 16/8 mcg/mL PRECIOUS Final    Ceftazidime+Avibactam [*]  Sensitive <=4 mcg/mL PRECIOUS Final    Tobramycin Sensitive <=2 mcg/mL PRECIOUS Final    Ertapenem [*]  Sensitive <=0.5 mcg/mL PRECIOUS Final    Nitrofurantoin Sensitive <=32 mcg/mL PRECIOUS Final    Gentamicin Sensitive <=2 mcg/mL PRECIOUS Final    Imipenem [*]  Sensitive <=1 mcg/mL PRECIOUS Final    Levofloxacin Sensitive <=0.5 mcg/mL PRECIOUS Final    Meropenem [*]  Sensitive <=1 mcg/mL PRECIOUS Final    Meropenem/Vaborbactam [*]  Sensitive <=2 mcg/mL PRECIOUS Final    Pip/Tazobactam Sensitive <=8 mcg/mL PRECIOUS Final    Trimeth/Sulfa Resistant >2/38 mcg/mL PRECIOUS Final    Tetracycline [*]  Sensitive <=4 mcg/mL PRECIOUS Final    Tigecycline Sensitive <=2 mcg/mL PRECIOUS Final           [*]   Suppressed Antibiotic                 BLOOD CULTURE [[id number]] Collected: 02/03/21 1930    Order Status: Completed Specimen: Blood from Peripheral Updated: 02/04/21 0653     Significant Indicator NEG     Source BLD     Site PERIPHERAL     Culture Result No Growth  Note: Blood cultures are incubated for 5 days and  are monitored continuously.Positive blood cultures  are called to the RN and reported as soon as  they are identified.      Narrative:      2 of 2 blood culture x2  Sites order. Per Hospital Policy:  Only change Specimen Src: to \"Line\" if specified by physician  order.  Left Hand    BLOOD CULTURE [[id number]] Collected: 02/03/21 2136    Order Status: Completed Specimen: Blood from Peripheral Updated: 02/04/21 0653     Significant Indicator NEG     Source BLD     Site PERIPHERAL     Culture Result No Growth  Note: Blood cultures are incubated for 5 days and  are monitored continuously.Positive blood cultures  are called to the RN and reported as soon as  they are identified.      Narrative:      1 of 2 for Blood Culture x 2 sites order. Per Hospital  Policy: Only change Specimen Src: to \"Line\" if specified by  physician order.  Left Hand    " "SARS-CoV-2 PCR (24 hour In-House): Collect NP swab in [facility] [[id number]] Collected: 02/03/21 1945    Order Status: Completed Specimen: Respirate Updated: 02/04/21 0016     SARS-CoV-2 Source NP Swab     SARS-CoV-2 by PCR NotDetected     Comment: PATIENTS: Important information regarding your results and instructions can  be found at [email or web address]   \"After your  Covid-19 Test\"  [facility] providers: PLEASE REFER TO DE-ESCALATION AND RETESTING PROTOCOL  on [email or web address]  **The TaqPath COVID-19 SARS-CoV-2 test has been made available for use under  the Emergency Use Authorization (EUA) only.         Narrative:      Have you been in close contact with a person who is suspected  or known to be positive for COVID-19 within the last 30 days  (e.g. last seen that person < 30 days ago)->Unknown    URINALYSIS [[id number]]  (Abnormal) Collected: 02/03/21 2110    Order Status: Completed Specimen: Urine Updated: 02/03/21 2212     Color Yellow     Character Turbid     Specific Gravity 1.024     Ph 5.5     Glucose Negative mg/dL      Ketones 15 mg/dL      Protein 100 mg/dL      Bilirubin Negative     Urobilinogen, Urine 1.0     Nitrite Negative     Leukocyte Esterase Large     Occult Blood Moderate     Micro Urine Req Microscopic    Narrative:      Indication for culture:->Evaluation for sepsis without a  clear source of infection          Plan:   Isolated organism is resistant to prescribed therapy. Patient uses methamphetamine which could prolong the QTc interval, will use Augmentin over Ciprofloxacin.    Attempted to contact patient using both phone numbers, one went straight to a full voicemail and the other kept ringing. Could not leave message will send Britely message with culture results.  If patient calls back please call in the following prescription:     New Rx:  Augmentin 875/125 mg BID  x 7 days #14, no refills - MD: Christ per protocol    Dmai Villar, PharmD    "

## 2021-02-12 NOTE — PROGRESS NOTES
Called patient and he confirmed he received Revlimid today and everything was ok, he was very thankful.       Attending Hospitalist today is Dr. Potts starting at 0700. Please call this Physician for orders, updates and questions.

## 2021-06-17 VITALS
HEART RATE: 123 BPM | OXYGEN SATURATION: 100 % | SYSTOLIC BLOOD PRESSURE: 141 MMHG | DIASTOLIC BLOOD PRESSURE: 97 MMHG | TEMPERATURE: 98.6 F | WEIGHT: 120 LBS | RESPIRATION RATE: 18 BRPM | BODY MASS INDEX: 21.26 KG/M2 | HEIGHT: 63 IN

## 2021-06-17 LAB — EKG IMPRESSION: NORMAL

## 2021-06-17 PROCEDURE — 302449 STATCHG TRIAGE ONLY (STATISTIC)

## 2021-06-17 PROCEDURE — 93005 ELECTROCARDIOGRAM TRACING: CPT

## 2021-06-17 ASSESSMENT — FIBROSIS 4 INDEX: FIB4 SCORE: 0.32

## 2021-06-18 ENCOUNTER — HOSPITAL ENCOUNTER (EMERGENCY)
Facility: MEDICAL CENTER | Age: 26
End: 2021-06-18
Payer: MEDICAID

## 2021-06-18 NOTE — ED TRIAGE NOTES
"Chief Complaint   Patient presents with   • Abdominal Pain     16weeks     Pt states she is 4weeks pregnant and has pain \"all over\" but worse in the abd    Pt & staff masked and in appropriate PPE during encounter.  Pt denies fever/travel or being in contact with anyone testing positive for Covid.  Explained pt the triage process, made pt aware to tell the RN/staff of any changes/concerns, pt verbalized understanding of process and instructions given.  Pt to ER lobby.    "

## 2021-06-20 ENCOUNTER — APPOINTMENT (OUTPATIENT)
Dept: RADIOLOGY | Facility: MEDICAL CENTER | Age: 26
DRG: 831 | End: 2021-06-20
Attending: EMERGENCY MEDICINE
Payer: MEDICAID

## 2021-06-20 ENCOUNTER — HOSPITAL ENCOUNTER (INPATIENT)
Facility: MEDICAL CENTER | Age: 26
LOS: 1 days | DRG: 831 | End: 2021-06-21
Attending: EMERGENCY MEDICINE | Admitting: STUDENT IN AN ORGANIZED HEALTH CARE EDUCATION/TRAINING PROGRAM
Payer: MEDICAID

## 2021-06-20 DIAGNOSIS — A41.9 SEPSIS WITHOUT ACUTE ORGAN DYSFUNCTION, DUE TO UNSPECIFIED ORGANISM (HCC): ICD-10-CM

## 2021-06-20 DIAGNOSIS — N12 PYELONEPHRITIS: ICD-10-CM

## 2021-06-20 DIAGNOSIS — Z3A.14 14 WEEKS GESTATION OF PREGNANCY: ICD-10-CM

## 2021-06-20 DIAGNOSIS — F15.10 METHAMPHETAMINE ABUSE (HCC): ICD-10-CM

## 2021-06-20 LAB
ALBUMIN SERPL BCP-MCNC: 3.1 G/DL (ref 3.2–4.9)
ALBUMIN/GLOB SERPL: 1 G/DL
ALP SERPL-CCNC: 109 U/L (ref 30–99)
ALT SERPL-CCNC: 11 U/L (ref 2–50)
AMPHET UR QL SCN: POSITIVE
ANION GAP SERPL CALC-SCNC: 12 MMOL/L (ref 7–16)
APPEARANCE UR: ABNORMAL
AST SERPL-CCNC: 14 U/L (ref 12–45)
B-HCG SERPL-ACNC: ABNORMAL MIU/ML (ref 0–5)
BACTERIA #/AREA URNS HPF: ABNORMAL /HPF
BARBITURATES UR QL SCN: NEGATIVE
BASOPHILS # BLD AUTO: 0.2 % (ref 0–1.8)
BASOPHILS # BLD: 0.03 K/UL (ref 0–0.12)
BENZODIAZ UR QL SCN: POSITIVE
BILIRUB SERPL-MCNC: 0.5 MG/DL (ref 0.1–1.5)
BILIRUB UR QL STRIP.AUTO: NEGATIVE
BUN SERPL-MCNC: 7 MG/DL (ref 8–22)
BZE UR QL SCN: NEGATIVE
CALCIUM SERPL-MCNC: 8.3 MG/DL (ref 8.5–10.5)
CANNABINOIDS UR QL SCN: NEGATIVE
CHLORIDE SERPL-SCNC: 96 MMOL/L (ref 96–112)
CO2 SERPL-SCNC: 22 MMOL/L (ref 20–33)
COLOR UR: YELLOW
CREAT SERPL-MCNC: 0.55 MG/DL (ref 0.5–1.4)
EOSINOPHIL # BLD AUTO: 0.02 K/UL (ref 0–0.51)
EOSINOPHIL NFR BLD: 0.1 % (ref 0–6.9)
EPI CELLS #/AREA URNS HPF: ABNORMAL /HPF
ERYTHROCYTE [DISTWIDTH] IN BLOOD BY AUTOMATED COUNT: 42.2 FL (ref 35.9–50)
GLOBULIN SER CALC-MCNC: 3.2 G/DL (ref 1.9–3.5)
GLUCOSE SERPL-MCNC: 80 MG/DL (ref 65–99)
GLUCOSE UR STRIP.AUTO-MCNC: NEGATIVE MG/DL
HCG SERPL QL: POSITIVE
HCT VFR BLD AUTO: 37.2 % (ref 37–47)
HGB BLD-MCNC: 13 G/DL (ref 12–16)
HYALINE CASTS #/AREA URNS LPF: ABNORMAL /LPF
IMM GRANULOCYTES # BLD AUTO: 0.21 K/UL (ref 0–0.11)
IMM GRANULOCYTES NFR BLD AUTO: 1.3 % (ref 0–0.9)
KETONES UR STRIP.AUTO-MCNC: ABNORMAL MG/DL
LACTATE BLD-SCNC: 1.6 MMOL/L (ref 0.5–2)
LEUKOCYTE ESTERASE UR QL STRIP.AUTO: ABNORMAL
LIPASE SERPL-CCNC: 17 U/L (ref 11–82)
LYMPHOCYTES # BLD AUTO: 1.42 K/UL (ref 1–4.8)
LYMPHOCYTES NFR BLD: 8.9 % (ref 22–41)
MAGNESIUM SERPL-MCNC: 1.8 MG/DL (ref 1.5–2.5)
MCH RBC QN AUTO: 32.3 PG (ref 27–33)
MCHC RBC AUTO-ENTMCNC: 34.9 G/DL (ref 33.6–35)
MCV RBC AUTO: 92.5 FL (ref 81.4–97.8)
METHADONE UR QL SCN: NEGATIVE
MICRO URNS: ABNORMAL
MONOCYTES # BLD AUTO: 1.58 K/UL (ref 0–0.85)
MONOCYTES NFR BLD AUTO: 9.9 % (ref 0–13.4)
NEUTROPHILS # BLD AUTO: 12.72 K/UL (ref 2–7.15)
NEUTROPHILS NFR BLD: 79.6 % (ref 44–72)
NITRITE UR QL STRIP.AUTO: NEGATIVE
NRBC # BLD AUTO: 0 K/UL
NRBC BLD-RTO: 0 /100 WBC
NUMBER OF RH DOSES IND 8505RD: 1
OPIATES UR QL SCN: NEGATIVE
OXYCODONE UR QL SCN: NEGATIVE
PCP UR QL SCN: NEGATIVE
PH UR STRIP.AUTO: 6.5 [PH] (ref 5–8)
PLATELET # BLD AUTO: 298 K/UL (ref 164–446)
PMV BLD AUTO: 9.7 FL (ref 9–12.9)
POTASSIUM SERPL-SCNC: 3.7 MMOL/L (ref 3.6–5.5)
PROPOXYPH UR QL SCN: NEGATIVE
PROT SERPL-MCNC: 6.3 G/DL (ref 6–8.2)
PROT UR QL STRIP: 100 MG/DL
RBC # BLD AUTO: 4.02 M/UL (ref 4.2–5.4)
RBC UR QL AUTO: ABNORMAL
RH BLD: NORMAL
SODIUM SERPL-SCNC: 130 MMOL/L (ref 135–145)
SP GR UR STRIP.AUTO: 1.02
UROBILINOGEN UR STRIP.AUTO-MCNC: 2 MG/DL
WBC # BLD AUTO: 16 K/UL (ref 4.8–10.8)
WBC #/AREA URNS HPF: ABNORMAL /HPF

## 2021-06-20 PROCEDURE — 87086 URINE CULTURE/COLONY COUNT: CPT

## 2021-06-20 PROCEDURE — 80053 COMPREHEN METABOLIC PANEL: CPT

## 2021-06-20 PROCEDURE — 84703 CHORIONIC GONADOTROPIN ASSAY: CPT

## 2021-06-20 PROCEDURE — 700102 HCHG RX REV CODE 250 W/ 637 OVERRIDE(OP): Performed by: STUDENT IN AN ORGANIZED HEALTH CARE EDUCATION/TRAINING PROGRAM

## 2021-06-20 PROCEDURE — A9270 NON-COVERED ITEM OR SERVICE: HCPCS | Performed by: STUDENT IN AN ORGANIZED HEALTH CARE EDUCATION/TRAINING PROGRAM

## 2021-06-20 PROCEDURE — 84702 CHORIONIC GONADOTROPIN TEST: CPT

## 2021-06-20 PROCEDURE — 76816 OB US FOLLOW-UP PER FETUS: CPT

## 2021-06-20 PROCEDURE — 700111 HCHG RX REV CODE 636 W/ 250 OVERRIDE (IP): Performed by: EMERGENCY MEDICINE

## 2021-06-20 PROCEDURE — 83690 ASSAY OF LIPASE: CPT

## 2021-06-20 PROCEDURE — U0005 INFEC AGEN DETEC AMPLI PROBE: HCPCS

## 2021-06-20 PROCEDURE — 85025 COMPLETE CBC W/AUTO DIFF WBC: CPT

## 2021-06-20 PROCEDURE — 87077 CULTURE AEROBIC IDENTIFY: CPT | Mod: 91

## 2021-06-20 PROCEDURE — 96374 THER/PROPH/DIAG INJ IV PUSH: CPT

## 2021-06-20 PROCEDURE — 87040 BLOOD CULTURE FOR BACTERIA: CPT

## 2021-06-20 PROCEDURE — 83735 ASSAY OF MAGNESIUM: CPT

## 2021-06-20 PROCEDURE — 83605 ASSAY OF LACTIC ACID: CPT

## 2021-06-20 PROCEDURE — 99223 1ST HOSP IP/OBS HIGH 75: CPT | Performed by: STUDENT IN AN ORGANIZED HEALTH CARE EDUCATION/TRAINING PROGRAM

## 2021-06-20 PROCEDURE — U0003 INFECTIOUS AGENT DETECTION BY NUCLEIC ACID (DNA OR RNA); SEVERE ACUTE RESPIRATORY SYNDROME CORONAVIRUS 2 (SARS-COV-2) (CORONAVIRUS DISEASE [COVID-19]), AMPLIFIED PROBE TECHNIQUE, MAKING USE OF HIGH THROUGHPUT TECHNOLOGIES AS DESCRIBED BY CMS-2020-01-R: HCPCS

## 2021-06-20 PROCEDURE — 87186 SC STD MICRODIL/AGAR DIL: CPT

## 2021-06-20 PROCEDURE — 770001 HCHG ROOM/CARE - MED/SURG/GYN PRIV*

## 2021-06-20 PROCEDURE — 86901 BLOOD TYPING SEROLOGIC RH(D): CPT

## 2021-06-20 PROCEDURE — 99285 EMERGENCY DEPT VISIT HI MDM: CPT

## 2021-06-20 PROCEDURE — 80307 DRUG TEST PRSMV CHEM ANLYZR: CPT

## 2021-06-20 PROCEDURE — 700102 HCHG RX REV CODE 250 W/ 637 OVERRIDE(OP): Performed by: EMERGENCY MEDICINE

## 2021-06-20 PROCEDURE — A9270 NON-COVERED ITEM OR SERVICE: HCPCS | Performed by: EMERGENCY MEDICINE

## 2021-06-20 PROCEDURE — 700105 HCHG RX REV CODE 258: Performed by: EMERGENCY MEDICINE

## 2021-06-20 PROCEDURE — 81001 URINALYSIS AUTO W/SCOPE: CPT

## 2021-06-20 RX ORDER — SODIUM CHLORIDE, SODIUM LACTATE, POTASSIUM CHLORIDE, CALCIUM CHLORIDE 600; 310; 30; 20 MG/100ML; MG/100ML; MG/100ML; MG/100ML
1000 INJECTION, SOLUTION INTRAVENOUS ONCE
Status: COMPLETED | OUTPATIENT
Start: 2021-06-20 | End: 2021-06-20

## 2021-06-20 RX ORDER — AMOXICILLIN 250 MG
2 CAPSULE ORAL 2 TIMES DAILY
Status: DISCONTINUED | OUTPATIENT
Start: 2021-06-20 | End: 2021-06-20

## 2021-06-20 RX ORDER — SODIUM CHLORIDE, SODIUM LACTATE, POTASSIUM CHLORIDE, CALCIUM CHLORIDE 600; 310; 30; 20 MG/100ML; MG/100ML; MG/100ML; MG/100ML
1000 INJECTION, SOLUTION INTRAVENOUS ONCE
Status: COMPLETED | OUTPATIENT
Start: 2021-06-20 | End: 2021-06-21

## 2021-06-20 RX ORDER — PROCHLORPERAZINE EDISYLATE 5 MG/ML
5-10 INJECTION INTRAMUSCULAR; INTRAVENOUS EVERY 4 HOURS PRN
Status: DISCONTINUED | OUTPATIENT
Start: 2021-06-20 | End: 2021-06-20

## 2021-06-20 RX ORDER — PROMETHAZINE HYDROCHLORIDE 25 MG/1
12.5-25 TABLET ORAL EVERY 4 HOURS PRN
Status: DISCONTINUED | OUTPATIENT
Start: 2021-06-20 | End: 2021-06-20

## 2021-06-20 RX ORDER — ONDANSETRON 4 MG/1
4 TABLET, ORALLY DISINTEGRATING ORAL EVERY 4 HOURS PRN
Status: DISCONTINUED | OUTPATIENT
Start: 2021-06-20 | End: 2021-06-20

## 2021-06-20 RX ORDER — ACETAMINOPHEN 325 MG/1
650 TABLET ORAL EVERY 6 HOURS PRN
Status: DISCONTINUED | OUTPATIENT
Start: 2021-06-20 | End: 2021-06-21 | Stop reason: HOSPADM

## 2021-06-20 RX ORDER — SODIUM CHLORIDE, SODIUM LACTATE, POTASSIUM CHLORIDE, AND CALCIUM CHLORIDE .6; .31; .03; .02 G/100ML; G/100ML; G/100ML; G/100ML
1000 INJECTION, SOLUTION INTRAVENOUS ONCE
Status: COMPLETED | OUTPATIENT
Start: 2021-06-20 | End: 2021-06-20

## 2021-06-20 RX ORDER — PROMETHAZINE HYDROCHLORIDE 25 MG/1
12.5-25 SUPPOSITORY RECTAL EVERY 4 HOURS PRN
Status: DISCONTINUED | OUTPATIENT
Start: 2021-06-20 | End: 2021-06-20

## 2021-06-20 RX ORDER — ACETAMINOPHEN 325 MG/1
650 TABLET ORAL ONCE
Status: COMPLETED | OUTPATIENT
Start: 2021-06-20 | End: 2021-06-20

## 2021-06-20 RX ORDER — PYRIDOXINE HCL (VITAMIN B6) 25 MG
25 TABLET ORAL 3 TIMES DAILY
Status: DISCONTINUED | OUTPATIENT
Start: 2021-06-20 | End: 2021-06-21 | Stop reason: HOSPADM

## 2021-06-20 RX ORDER — ONDANSETRON 2 MG/ML
4 INJECTION INTRAMUSCULAR; INTRAVENOUS EVERY 4 HOURS PRN
Status: DISCONTINUED | OUTPATIENT
Start: 2021-06-20 | End: 2021-06-20

## 2021-06-20 RX ORDER — HEPARIN SODIUM 5000 [USP'U]/ML
5000 INJECTION, SOLUTION INTRAVENOUS; SUBCUTANEOUS EVERY 8 HOURS
Status: DISCONTINUED | OUTPATIENT
Start: 2021-06-20 | End: 2021-06-20

## 2021-06-20 RX ORDER — BISACODYL 10 MG
10 SUPPOSITORY, RECTAL RECTAL
Status: DISCONTINUED | OUTPATIENT
Start: 2021-06-20 | End: 2021-06-20

## 2021-06-20 RX ADMIN — CEFTRIAXONE SODIUM 1 G: 10 INJECTION, POWDER, FOR SOLUTION INTRAVENOUS at 20:49

## 2021-06-20 RX ADMIN — SODIUM CHLORIDE, POTASSIUM CHLORIDE, SODIUM LACTATE AND CALCIUM CHLORIDE 1000 ML: 600; 310; 30; 20 INJECTION, SOLUTION INTRAVENOUS at 16:39

## 2021-06-20 RX ADMIN — DOXYLAMINE SUCCINATE 25 MG: 25 TABLET ORAL at 22:15

## 2021-06-20 RX ADMIN — Medication 25 MG: at 22:14

## 2021-06-20 RX ADMIN — SODIUM CHLORIDE, POTASSIUM CHLORIDE, SODIUM LACTATE AND CALCIUM CHLORIDE 1000 ML: 600; 310; 30; 20 INJECTION, SOLUTION INTRAVENOUS at 20:52

## 2021-06-20 RX ADMIN — SODIUM CHLORIDE, POTASSIUM CHLORIDE, SODIUM LACTATE AND CALCIUM CHLORIDE 1000 ML: 600; 310; 30; 20 INJECTION, SOLUTION INTRAVENOUS at 23:04

## 2021-06-20 RX ADMIN — ACETAMINOPHEN 650 MG: 325 TABLET, FILM COATED ORAL at 20:47

## 2021-06-20 ASSESSMENT — PATIENT HEALTH QUESTIONNAIRE - PHQ9
2. FEELING DOWN, DEPRESSED, IRRITABLE, OR HOPELESS: NOT AT ALL
1. LITTLE INTEREST OR PLEASURE IN DOING THINGS: NOT AT ALL
SUM OF ALL RESPONSES TO PHQ9 QUESTIONS 1 AND 2: 0

## 2021-06-20 ASSESSMENT — LIFESTYLE VARIABLES: SUBSTANCE_ABUSE: 1

## 2021-06-20 ASSESSMENT — PAIN DESCRIPTION - PAIN TYPE
TYPE: ACUTE PAIN
TYPE: ACUTE PAIN

## 2021-06-20 ASSESSMENT — ENCOUNTER SYMPTOMS
NAUSEA: 0
VOMITING: 0
TREMORS: 0
DIARRHEA: 0
MYALGIAS: 0
DIAPHORESIS: 1
ORTHOPNEA: 0
FEVER: 1
ABDOMINAL PAIN: 1
EYE PAIN: 0
HEMOPTYSIS: 0
FOCAL WEAKNESS: 0
COUGH: 0
BLURRED VISION: 0
HEADACHES: 0
WEAKNESS: 0
FLANK PAIN: 1
SINUS PAIN: 0
NERVOUS/ANXIOUS: 1
CHILLS: 1

## 2021-06-20 ASSESSMENT — FIBROSIS 4 INDEX
FIB4 SCORE: 0.35
FIB4 SCORE: 0.32

## 2021-06-20 NOTE — ED PROVIDER NOTES
ED Provider Note    CHIEF COMPLAINT  Chief Complaint   Patient presents with   • Abdominal Pain     BIB EMS after LLQ pain x's 6 days. c/o weakness and lethargy. Reports possible 16 weeks pregnant. hx of miscarriage. States she has not had any prenatal care       HPI  Stephanie Hoffmann is a 25 y.o. female who presents with complaints of fevers, chills, nausea, vomiting, abdominal pain for the past 6 days.  Patient has been having pain in her left lower quadrant along with increased weakness and fatigue.  Patient is a very poor historian cannot tell me how many times she has been pregnant, but says she has had several miscarriages and therapeutic abortions in the past.  She has had one viable pregnancy by normal vaginal delivery.  She states she is pregnant, but does not know how far along.  She cannot remember when her last menstrual period occurred.  She has had a fever for the last week along with some shaking chills.  She has had pain to the left mid to lower abdomen that radiates into her left flank, along with some nausea and vomiting.  She has had no vaginal bleeding or discharge.  She denies any urinary symptoms such as dysuria or increased urinary frequency.  She has had no sore throat, cough, congestion, or any difficulty breathing.  She has had no prenatal care this point, says she has an appointment next week.      REVIEW OF SYSTEMS  See HPI for further details. All other systems are negative.     PAST MEDICAL HISTORY  Past Medical History:   Diagnosis Date   • Acute pyelonephritis 12/20/2019   • Anemia    • Anxiety    • Head ache    • Hypertension     PIH   • Substance abuse (HCC) 2018    Patient states last used 3 days ago.    • Urinary tract infection        FAMILY HISTORY  Family History   Problem Relation Age of Onset   • No Known Problems Mother    • No Known Problems Father    • No Known Problems Sister    • Diabetes Brother    • Thyroid Brother        SOCIAL HISTORY  Social History     Tobacco  "Use   • Smoking status: Current Every Day Smoker     Packs/day: 0.25     Years: 12.00     Pack years: 3.00     Types: Cigarettes   • Smokeless tobacco: Never Used   • Tobacco comment: Pt states smoking 4 a day.    Vaping Use   • Vaping Use: Never used   Substance Use Topics   • Alcohol use: No   • Drug use: Yes     Types: Methamphetamines, Marijuana     Comment: Pt states last used 3 days ago, marijuana now and then      Social History     Substance and Sexual Activity   Drug Use Yes   • Types: Methamphetamines, Marijuana    Comment: Pt states last used 3 days ago, marijuana now and then       SURGICAL HISTORY  No past surgical history on file.    CURRENT MEDICATIONS  Home Medications    **Home medications have not yet been reviewed for this encounter**         ALLERGIES  No Known Allergies    PHYSICAL EXAM0  VITAL SIGNS: Blood Pressure 133/77   Pulse (Abnormal) 127   Temperature (Abnormal) 38.2 °C (100.8 °F) (Oral)   Respiration 19   Height 1.6 m (5' 3\")   Weight 59 kg (130 lb)   Oxygen Saturation 96%   Body Mass Index 23.03 kg/m²   Constitutional: Awake, alert, in no acute distress, Non-toxic appearance.   HENT: Atraumatic. Bilateral external ears normal, mucous membranes moist, throat nonerythematous without exudates, nose is normal.  Eyes: PERRL, EOMI, conjunctiva moist, noninjected.  Neck: Nontender, Normal range of motion, No nuchal rigidity, No stridor.   Lymphatic: No lymphadenopathy noted.   Cardiovascular: Regular rate and rhythm, no murmurs, rubs, gallops.  Thorax & Lungs:  Good breath sounds bilaterally, no wheezes, rales, or retractions.  No chest tenderness.  Abdomen: Bowel sounds normal, Soft, nontender, nondistended, no rebound, guarding, masses.  Back: No CVA or spinal tenderness.  Extremities: Intact distal pulses, No edema, No tenderness.   Skin: Warm, Dry, No rashes.   Musculoskeletal: No joint swelling or tenderness.  Neurologic: Alert & oriented x 3, sensory and motor function normal. No " focal deficits.   Psychiatric: Affect normal, Judgment normal, Mood normal.         LABS  Labs Reviewed   CBC WITH DIFFERENTIAL - Abnormal; Notable for the following components:       Result Value    WBC 16.0 (*)     RBC 4.02 (*)     Neutrophils-Polys 79.60 (*)     Lymphocytes 8.90 (*)     Immature Granulocytes 1.30 (*)     Neutrophils (Absolute) 12.72 (*)     Monos (Absolute) 1.58 (*)     Immature Granulocytes (abs) 0.21 (*)     All other components within normal limits   COMP METABOLIC PANEL - Abnormal; Notable for the following components:    Sodium 130 (*)     Bun 7 (*)     Calcium 8.3 (*)     Alkaline Phosphatase 109 (*)     Albumin 3.1 (*)     All other components within normal limits   URINALYSIS - Abnormal; Notable for the following components:    Character Cloudy (*)     Ketones Trace (*)     Protein 100 (*)     Leukocyte Esterase Large (*)     Occult Blood Moderate (*)     All other components within normal limits    Narrative:     Indication for culture:->Evaluation for sepsis without a  clear source of infection   HCG QUAL SERUM - Abnormal; Notable for the following components:    Beta-Hcg Qualitative Serum Positive (*)     All other components within normal limits    Narrative:     Print Consent?->No   URINE DRUG SCREEN - Abnormal; Notable for the following components:    Amphetamines Urine Positive (*)     Benzodiazepines Positive (*)     All other components within normal limits    Narrative:     Indication for culture:->Evaluation for sepsis without a  clear source of infection   URINE MICROSCOPIC (W/UA) - Abnormal; Notable for the following components:    WBC Packed (*)     Bacteria Many (*)     All other components within normal limits    Narrative:     Indication for culture:->Evaluation for sepsis without a  clear source of infection   LACTIC ACID   URINE CULTURE(NEW)    Narrative:     Indication for culture:->Evaluation for sepsis without a  clear source of infection   BLOOD CULTURE     "Narrative:     Per Hospital Policy: Only change Specimen Src: to \"Line\" if  specified by physician order.   ESTIMATED GFR   RH TYPE FOR RHOGAM FROM E.D.    Narrative:     Print Consent?->No   BLOOD CULTURE   SARS-COV-2, PCR (IN-HOUSE)   LIPASE   HCG QUANTITATIVE       All labs reviewed by me.      RADIOLOGY/PROCEDURES  US-OB LIMITED GROWTH FOLLOW UP Is the patient pregnant? Yes   Final Result      Single intrauterine pregnancy of an estimated gestational age of 14 weeks 4 days with an estimated date of delivery of 12/15/2021.      Cystic hygroma is noted in the fetal posterior neck area measuring 1.5 cm in thickness.          The radiologist's interpretations of all radiological studies have been reviewed by me.        COURSE & MEDICAL DECISION MAKING  Pertinent Labs & Imaging studies reviewed. (See chart for details)  Patient presents with above complaints.  She was noted to be febrile, was complaining of some abdominal pain and flank pain.  Sepsis protocol was initiated.  IV was placed, she was given a liter bolus lactated Ringer's.  Her abdominal exam is fairly benign with no peritoneal signs.  OB ultrasound was obtained which shows a single intrauterine pregnancy estimated gestational age of 14 weeks 4 days.  There appeared to be a cystic hygroma noted in the fetal posterior neck area measuring 1.5 cm in thickness.    CBC shows a white count of 16,000, hemoglobin 13.0, platelets 298 with 80% polys, 9% lymphs, chemistry shows a sodium of 130, BUN 7, creatinine 0.55, glucose 80, liver function test normal, lactic acid 1.6.  On review of old chart, patient was treated for possible pyelonephritis with Bactrim in in February of this year.  Her urine culture grew out E. coli which was resistant to Bactrim.  The patient says she never took antibiotics.  After 3 hours patient was able to provide a urine specimen after being given 2 L of lactated Ringer's..  Patient remained febrile and tachycardic, and was continue IV " fluids, given a dose of Tylenol.  Urinalysis shows trace ketones, 100 protein, large leukocyte esterase, moderate blood, with packed WBCs and many bacteria.  Patient appears to have pyelonephritis.  She was given Rocephin 1 g IVPB.  Urine drug screen was positive for amphetamines and benzodiazepines.  I spoke with Dr. Spangler on-call for the pregnancy center and nothing needs to be done immediately about the cystic hygroma on the fetus noted on ultrasound.  Given her symptoms she will be admitted. Case was  discussed with the hospitalist.  Critical care time of 35 minutes.    FINAL IMPRESSION  1. Sepsis without acute organ dysfunction, due to unspecified organism (HCC)    2. Pyelonephritis    3. 14 weeks gestation of pregnancy    4. Methamphetamine abuse (HCC)          Electronically signed by: Byron Wiseman M.D., 6/20/2021 4:38 PM

## 2021-06-20 NOTE — ED TRIAGE NOTES
"..  Chief Complaint   Patient presents with   • Abdominal Pain     BIB EMS after LLQ pain x's 6 days. c/o weakness and lethargy. Reports possible 16 weeks pregnant. hx of miscarriage. States she has not had any prenatal care     ../70   Pulse (!) 123   Temp (!) 38.2 °C (100.8 °F) (Oral)   Resp 15   Ht 1.6 m (5' 3\")   Wt 59 kg (130 lb)   SpO2 97%   "

## 2021-06-21 VITALS
HEART RATE: 89 BPM | DIASTOLIC BLOOD PRESSURE: 89 MMHG | TEMPERATURE: 97.3 F | WEIGHT: 136.47 LBS | SYSTOLIC BLOOD PRESSURE: 136 MMHG | HEIGHT: 63 IN | BODY MASS INDEX: 24.18 KG/M2 | OXYGEN SATURATION: 100 % | RESPIRATION RATE: 16 BRPM

## 2021-06-21 PROBLEM — A41.9 SEPSIS (HCC): Status: RESOLVED | Noted: 2019-12-20 | Resolved: 2021-06-21

## 2021-06-21 PROBLEM — N10 ACUTE PYELONEPHRITIS: Status: RESOLVED | Noted: 2019-12-20 | Resolved: 2021-06-21

## 2021-06-21 LAB
ALBUMIN SERPL BCP-MCNC: 2.3 G/DL (ref 3.2–4.9)
ALBUMIN/GLOB SERPL: 0.8 G/DL
ALP SERPL-CCNC: 94 U/L (ref 30–99)
ALT SERPL-CCNC: 11 U/L (ref 2–50)
ANION GAP SERPL CALC-SCNC: 10 MMOL/L (ref 7–16)
AST SERPL-CCNC: 20 U/L (ref 12–45)
BASOPHILS # BLD AUTO: 0.5 % (ref 0–1.8)
BASOPHILS # BLD: 0.06 K/UL (ref 0–0.12)
BILIRUB SERPL-MCNC: 0.3 MG/DL (ref 0.1–1.5)
BUN SERPL-MCNC: 6 MG/DL (ref 8–22)
CALCIUM SERPL-MCNC: 8 MG/DL (ref 8.5–10.5)
CHLORIDE SERPL-SCNC: 105 MMOL/L (ref 96–112)
CO2 SERPL-SCNC: 23 MMOL/L (ref 20–33)
CREAT SERPL-MCNC: 0.57 MG/DL (ref 0.5–1.4)
EOSINOPHIL # BLD AUTO: 0.03 K/UL (ref 0–0.51)
EOSINOPHIL NFR BLD: 0.3 % (ref 0–6.9)
ERYTHROCYTE [DISTWIDTH] IN BLOOD BY AUTOMATED COUNT: 44.2 FL (ref 35.9–50)
GLOBULIN SER CALC-MCNC: 2.9 G/DL (ref 1.9–3.5)
GLUCOSE SERPL-MCNC: 91 MG/DL (ref 65–99)
HCT VFR BLD AUTO: 35.3 % (ref 37–47)
HGB BLD-MCNC: 11.8 G/DL (ref 12–16)
IMM GRANULOCYTES # BLD AUTO: 0.21 K/UL (ref 0–0.11)
IMM GRANULOCYTES NFR BLD AUTO: 1.9 % (ref 0–0.9)
LYMPHOCYTES # BLD AUTO: 1.87 K/UL (ref 1–4.8)
LYMPHOCYTES NFR BLD: 16.5 % (ref 22–41)
MCH RBC QN AUTO: 32.2 PG (ref 27–33)
MCHC RBC AUTO-ENTMCNC: 33.4 G/DL (ref 33.6–35)
MCV RBC AUTO: 96.4 FL (ref 81.4–97.8)
MONOCYTES # BLD AUTO: 1.15 K/UL (ref 0–0.85)
MONOCYTES NFR BLD AUTO: 10.2 % (ref 0–13.4)
NEUTROPHILS # BLD AUTO: 7.99 K/UL (ref 2–7.15)
NEUTROPHILS NFR BLD: 70.6 % (ref 44–72)
NRBC # BLD AUTO: 0 K/UL
NRBC BLD-RTO: 0 /100 WBC
PLATELET # BLD AUTO: 216 K/UL (ref 164–446)
PMV BLD AUTO: 10 FL (ref 9–12.9)
POTASSIUM SERPL-SCNC: 3.7 MMOL/L (ref 3.6–5.5)
PROT SERPL-MCNC: 5.2 G/DL (ref 6–8.2)
RBC # BLD AUTO: 3.66 M/UL (ref 4.2–5.4)
SARS-COV-2 RNA RESP QL NAA+PROBE: NOTDETECTED
SODIUM SERPL-SCNC: 138 MMOL/L (ref 135–145)
SPECIMEN SOURCE: NORMAL
WBC # BLD AUTO: 11.3 K/UL (ref 4.8–10.8)

## 2021-06-21 PROCEDURE — 99239 HOSP IP/OBS DSCHRG MGMT >30: CPT | Performed by: HOSPITALIST

## 2021-06-21 PROCEDURE — 85025 COMPLETE CBC W/AUTO DIFF WBC: CPT

## 2021-06-21 PROCEDURE — A9270 NON-COVERED ITEM OR SERVICE: HCPCS | Performed by: STUDENT IN AN ORGANIZED HEALTH CARE EDUCATION/TRAINING PROGRAM

## 2021-06-21 PROCEDURE — 700102 HCHG RX REV CODE 250 W/ 637 OVERRIDE(OP): Performed by: STUDENT IN AN ORGANIZED HEALTH CARE EDUCATION/TRAINING PROGRAM

## 2021-06-21 PROCEDURE — 80053 COMPREHEN METABOLIC PANEL: CPT

## 2021-06-21 PROCEDURE — 700101 HCHG RX REV CODE 250: Performed by: STUDENT IN AN ORGANIZED HEALTH CARE EDUCATION/TRAINING PROGRAM

## 2021-06-21 PROCEDURE — 36415 COLL VENOUS BLD VENIPUNCTURE: CPT

## 2021-06-21 PROCEDURE — 700111 HCHG RX REV CODE 636 W/ 250 OVERRIDE (IP): Performed by: STUDENT IN AN ORGANIZED HEALTH CARE EDUCATION/TRAINING PROGRAM

## 2021-06-21 RX ORDER — CEFDINIR 300 MG/1
300 CAPSULE ORAL 2 TIMES DAILY
Qty: 14 CAPSULE | Refills: 0 | Status: SHIPPED | OUTPATIENT
Start: 2021-06-21 | End: 2021-06-28

## 2021-06-21 RX ORDER — ONDANSETRON 4 MG/1
4 TABLET, FILM COATED ORAL EVERY 4 HOURS PRN
Qty: 20 TABLET | Refills: 0 | Status: SHIPPED | OUTPATIENT
Start: 2021-06-21 | End: 2021-06-25

## 2021-06-21 RX ADMIN — ENOXAPARIN SODIUM 40 MG: 40 INJECTION SUBCUTANEOUS at 05:16

## 2021-06-21 RX ADMIN — Medication 25 MG: at 12:29

## 2021-06-21 RX ADMIN — CEFTRIAXONE SODIUM 1 G: 10 INJECTION, POWDER, FOR SOLUTION INTRAVENOUS at 12:29

## 2021-06-21 RX ADMIN — ACETAMINOPHEN 650 MG: 325 TABLET, FILM COATED ORAL at 04:14

## 2021-06-21 RX ADMIN — Medication 25 MG: at 05:16

## 2021-06-21 ASSESSMENT — COGNITIVE AND FUNCTIONAL STATUS - GENERAL
MOBILITY SCORE: 24
DAILY ACTIVITIY SCORE: 24
SUGGESTED CMS G CODE MODIFIER MOBILITY: CH
SUGGESTED CMS G CODE MODIFIER DAILY ACTIVITY: CH

## 2021-06-21 ASSESSMENT — LIFESTYLE VARIABLES
DOES PATIENT WANT TO STOP DRINKING: NO
HAVE PEOPLE ANNOYED YOU BY CRITICIZING YOUR DRINKING: NO
HOW MANY TIMES IN THE PAST YEAR HAVE YOU HAD 5 OR MORE DRINKS IN A DAY: 0
EVER FELT BAD OR GUILTY ABOUT YOUR DRINKING: NO
TOTAL SCORE: 0
ALCOHOL_USE: YES
EVER HAD A DRINK FIRST THING IN THE MORNING TO STEADY YOUR NERVES TO GET RID OF A HANGOVER: NO
TOTAL SCORE: 0
AVERAGE NUMBER OF DAYS PER WEEK YOU HAVE A DRINK CONTAINING ALCOHOL: 0
CONSUMPTION TOTAL: NEGATIVE
TOTAL SCORE: 0
ON A TYPICAL DAY WHEN YOU DRINK ALCOHOL HOW MANY DRINKS DO YOU HAVE: 0
HAVE YOU EVER FELT YOU SHOULD CUT DOWN ON YOUR DRINKING: NO

## 2021-06-21 ASSESSMENT — PAIN DESCRIPTION - PAIN TYPE: TYPE: ACUTE PAIN

## 2021-06-21 NOTE — ASSESSMENT & PLAN NOTE
25-year-old lady with recurrent pyelonephritis in the past presented to the ER with left flank pain and fever chills  Positive for CVA tenderness left side, urine analysis back to WBC and leukocyte Estrace positive, WBC 16 K  Last urine culture from 2/2021 with E. coli sensitive to Rocephin  Patient received empiric Rocephin in the ER, fluid bolus with 2 L of Ringer lactate 1.6  Continue with IV fluid Ringer lactate at 83 cc/h  Continue with Rocephin 1 g 24-hour IV  Follow-up blood cultures, urine culture

## 2021-06-21 NOTE — PROGRESS NOTES
Bedside report received and patient care assumed. Pt transported to floor via ZOLL by this RN. Pt is resting in bed, appears lethargic, w/o sxs of distress, response to verbal stimuli A&Ox2, disoriented to time and situation. Pt states no complaints of pain, and is on RA. Tele box on, monitor room notified w/ RR confirmed and cardiac monitoring initiated.  All seizure, aspiration, and fall precautions are in place, belongings at bedside table.  Pt was updated on POC, no questions or concerns. Pt educated on use of call light for assistance.

## 2021-06-21 NOTE — ASSESSMENT & PLAN NOTE
Abdominal ultrasound with intrauterine gestation pregnancy of 14 weeks  With cystic hygroma  Discussed with OB/GYN no need for acute interventions  She has follow-up with OB/GYN next week  Dose medication  appropriate for second trimester

## 2021-06-21 NOTE — ASSESSMENT & PLAN NOTE
This is Sepsis Present on admission  SIRS criteria identified on my evaluation include: Fever, with temperature greater than 101 deg F, Tachycardia, with heart rate greater than 90 BPM and Leukocytosis, with WBC greater than 12,000  Source is genitourinary  Sepsis protocol initiated  Fluid resuscitation ordered per protocol  IV antibiotics as appropriate for source of sepsis  While organ dysfunction may be noted elsewhere in this problem list or in the chart, degree of organ dysfunction does not meet CMS criteria for severe sepsis

## 2021-06-21 NOTE — ED NOTES
Med rec updated and complete. Allergies reviewed. Pt reports not taking any medications.      Home pharmacy [facility] [phone]

## 2021-06-21 NOTE — ASSESSMENT & PLAN NOTE
Patient is a current IV methamphetamine user  Counseled patient at bedside regarding abstinence  Patient Is Motivated to quit  Follow-up echocardiogram in view of sepsis to rule out IE

## 2021-06-21 NOTE — ED NOTES
Pt medicated per MAR, covid swab collected and sent, updated on POC. Per pt she does not want anymore visitors tonight. NAD noted.

## 2021-06-21 NOTE — ED NOTES
"Pt had son at bedside, pt went to BR, s/o was at bedside at the time pt went to the BR. During pt in BR, pt came back and told staff that her son was gone, s/o not at bedside at the moment. Pt stated that someone was coming for son. Staff saw a tall male w/ kid, per staff when asking security the man had told security that he came to  his son. This RN asked pt about details of man and if he were the one that was going to  her son. Per pt she states that the father of child was only to drop off child, but not take him home, pt's mom was supposed to take child home. This RN asked pt about child's father's phone number and picture so it can be given to security and PD can be notified, per pt and her s/o they did not want to take it that far. Pt and s/o stated that child will be fine, per pt she \"will handle it herself later\" pt and s/o were reassured that if they changed their mind and needed to take further action to notify RN, pt verbalized understanding.   "

## 2021-06-21 NOTE — PROGRESS NOTES
Received report from NOC RN. Assumed care of patient at 0700. Patient A&Ox 4, speaking in full sentences, follows commands and responds appropriately to questions. On room air, no signs of respiratory distress. Respirations are even and unlabored. Patient states 2/10 pain at this time. POC discussed and agreed upon with patient. Call light and belongings within reach. Bed in lowest locked position. Upper side rails raised. Bed alarm on. Fall risk precautions in place. Hourly rounding. Will continue to monitor labs.

## 2021-06-21 NOTE — ED NOTES
Report from ZENAIDA Haynes. Pt ambulated to BR w/ steady gait. A&Ox4, RA. JULIO richardson MD at bedside.  and son at bedside. Updated on UA needed.

## 2021-06-21 NOTE — H&P
Hospital Medicine History & Physical Note    Date of Service  6/20/2021    Primary Care Physician  Pcp Pt States None    Consultants  none    Code Status  Full Code    Chief Complaint  Chief Complaint   Patient presents with   • Abdominal Pain     BIB EMS after LLQ pain x's 6 days. c/o weakness and lethargy. Reports possible 16 weeks pregnant. hx of miscarriage. States she has not had any prenatal care       History of Presenting Illness  25 y.o. female who presented 6/20/2021 with past medical history of hypertension, methamphetamine abuse, recurrent pyelonephritis, current variable intrauterine pregnancy 14 weeks presented to the ER by EMS for chief complaint of left lower quadrant abdominal pain for 6 days.  She reports constant sharp pain to her left flank radiating to her left lower quadrant abdomen 8/10 severity worse with movement no relieving factors visited the ER 2 days ago but left without ERP evaluation came back today for unrelenting left lower quadrant pain.  Patient has been spending most of his time in the bed due to the pain.  It is also associated with fever and chills for about 1 week.  She denies any dysuria, hematuria, pyuria, shortness of breath, chest pain, cough, sore throat,  palpitations, nausea, vomiting, focal weakness skin rashes, diarrhea, vaginal bleeding/discharge.  She last smoked methamphetamine about 2 days ago and used IV meth about 9 days ago.  She denies sharing needles.    In the ER she was found to be tachycardic with a heart rate 127, febrile T-max 100.8, blood pressure 133/77 saturating 96% on room air, she was found to have left CVA tenderness, her labs are significant for leukocytosis WBC 16k urinalysis with packed WBC, positive leukocytes, urine drug screen positive for benzodiazepine and amphetamine.  Her lactate was 1.6 and EKG from 6/17 with sinus tachycardia and borderline Q waves in inferior leads.  She denies any chest today but reports occasional chest  pressure.  Review of Systems  Review of Systems   Constitutional: Positive for chills, diaphoresis, fever and malaise/fatigue.   HENT: Negative for ear pain and sinus pain.    Eyes: Negative for blurred vision and pain.   Respiratory: Negative for cough and hemoptysis.    Cardiovascular: Negative for chest pain and orthopnea.   Gastrointestinal: Positive for abdominal pain. Negative for diarrhea, nausea and vomiting.   Genitourinary: Positive for flank pain. Negative for dysuria and hematuria.   Musculoskeletal: Negative for myalgias.   Skin: Negative for itching.   Neurological: Negative for tremors, focal weakness, weakness and headaches.   Psychiatric/Behavioral: Positive for substance abuse. Negative for suicidal ideas. The patient is nervous/anxious.        Past Medical History   has a past medical history of Acute pyelonephritis (12/20/2019), Anemia, Anxiety, Head ache, Hypertension, Substance abuse ([facility]) (2018), and Urinary tract infection.    Surgical History   has no past surgical history on file.     Family History  family history includes Diabetes in her brother; No Known Problems in her father, mother, and sister; Thyroid in her brother.     Social History   reports that she has been smoking cigarettes. She has a 3.00 pack-year smoking history. She has never used smokeless tobacco. She reports current drug use. Drugs: Methamphetamines and Marijuana. She reports that she does not drink alcohol.    Allergies  No Known Allergies    Medications  Prior to Admission Medications   Prescriptions Last Dose Informant Patient Reported? Taking?   lisinopril (PRINIVIL) 10 MG Tab Not Taking at Unknown time  No No   Sig: Take 1 Tab by mouth every day.   Patient not taking: Reported on 6/20/2021      Facility-Administered Medications: None       Physical Exam  Temp:  [38.2 °C (100.8 °F)] 38.2 °C (100.8 °F)  Pulse:  [114-128] 127  Resp:  [15-28] 19  BP: (110-136)/(70-90) 133/77  SpO2:  [89 %-99 %] 96 %    Physical  Exam  Vitals and nursing note reviewed.   Constitutional:       General: She is in acute distress.      Appearance: Normal appearance. She is ill-appearing.   HENT:      Head: Normocephalic and atraumatic.      Right Ear: External ear normal.      Left Ear: External ear normal.      Mouth/Throat:      Mouth: Mucous membranes are dry.   Eyes:      Extraocular Movements: Extraocular movements intact.      Conjunctiva/sclera: Conjunctivae normal.      Pupils: Pupils are equal, round, and reactive to light.   Cardiovascular:      Rate and Rhythm: Normal rate and regular rhythm.      Pulses: Normal pulses.      Heart sounds: Normal heart sounds.   Pulmonary:      Effort: Pulmonary effort is normal.      Breath sounds: Normal breath sounds.   Abdominal:      General: Abdomen is flat. Bowel sounds are normal.      Palpations: Abdomen is soft.      Tenderness: There is left CVA tenderness.   Musculoskeletal:         General: Normal range of motion.      Cervical back: Normal range of motion and neck supple.   Skin:     General: Skin is warm and dry.      Capillary Refill: Capillary refill takes 2 to 3 seconds.   Neurological:      General: No focal deficit present.      Mental Status: She is alert and oriented to person, place, and time.   Psychiatric:         Mood and Affect: Mood normal.         Laboratory:  Recent Labs     06/20/21  1540   WBC 16.0*   RBC 4.02*   HEMOGLOBIN 13.0   HEMATOCRIT 37.2   MCV 92.5   MCH 32.3   MCHC 34.9   RDW 42.2   PLATELETCT 298   MPV 9.7     Recent Labs     06/20/21  1540   SODIUM 130*   POTASSIUM 3.7   CHLORIDE 96   CO2 22   GLUCOSE 80   BUN 7*   CREATININE 0.55   CALCIUM 8.3*     Recent Labs     06/20/21  1540   ALTSGPT 11   ASTSGOT 14   ALKPHOSPHAT 109*   TBILIRUBIN 0.5   GLUCOSE 80         No results for input(s): NTPROBNP in the last 72 hours.      No results for input(s): TROPONINT in the last 72 hours.    Imaging:  US-OB LIMITED GROWTH FOLLOW UP Is the patient pregnant? Yes   Final  Result      Single intrauterine pregnancy of an estimated gestational age of 14 weeks 4 days with an estimated date of delivery of 12/15/2021.      Cystic hygroma is noted in the fetal posterior neck area measuring 1.5 cm in thickness.      EC-ECHOCARDIOGRAM COMPLETE W/O CONT    (Results Pending)         Assessment/Plan:  I anticipate this patient will require at least two midnights for appropriate medical management, necessitating inpatient admission.    Acute pyelonephritis- (present on admission)  Assessment & Plan  25-year-old lady with recurrent pyelonephritis in the past presented to the ER with left flank pain and fever chills  Positive for CVA tenderness left side, urine analysis back to WBC and leukocyte Estrace positive, WBC 16 K  Last urine culture from 2/2021 with E. coli sensitive to Rocephin  Patient received empiric Rocephin in the ER, fluid bolus with 2 L of Ringer lactate 1.6  Continue with IV fluid Ringer lactate at 83 cc/h  Continue with Rocephin 1 g 24-hour IV  Follow-up blood cultures, urine culture      Methamphetamine abuse (HCC)- (present on admission)  Assessment & Plan  Patient is a current IV methamphetamine user  Counseled patient at bedside regarding abstinence  Patient Is Motivated to quit  Follow-up echocardiogram in view of sepsis to rule out IE      14 weeks gestation of pregnancy  Assessment & Plan  Abdominal ultrasound with intrauterine gestation pregnancy of 14 weeks  With cystic hygroma  Discussed with OB/GYN no need for acute interventions  She has follow-up with OB/GYN next week  Dose medication  appropriate for second trimester    Sepsis (HCC)  Assessment & Plan  This is Sepsis Present on admission  SIRS criteria identified on my evaluation include: Fever, with temperature greater than 101 deg F, Tachycardia, with heart rate greater than 90 BPM and Leukocytosis, with WBC greater than 12,000  Source is genitourinary  Sepsis protocol initiated  Fluid resuscitation ordered per  protocol  IV antibiotics as appropriate for source of sepsis  While organ dysfunction may be noted elsewhere in this problem list or in the chart, degree of organ dysfunction does not meet CMS criteria for severe sepsis

## 2021-06-21 NOTE — ED NOTES
Report given to ZENAIDA Akhtar. Pt transferring to T737/01 via ami weber/ RN on cardiac monitor. Pt A&Ox4, RA, steady gait. Belongings w/i possession. NAD noted.

## 2021-06-21 NOTE — PROGRESS NOTES
4 Eyes Skin Assessment Completed by ZENAIDA Akhtar and ZENAIDA Mark.    Head WDL  Ears WDL  Nose WDL  Mouth WDL  Neck WDL  Breast/Chest WDL  Shoulder Blades WDL  Spine WDL  (R) Arm/Elbow/Hand WDL  (L) Arm/Elbow/Hand WDL  Abdomen WDL  Groin WDL  Scrotum/Coccyx/Buttocks WDL  (R) Leg WDL  (L) Leg WDL  (R) Heel/Foot/Toe Sole of foot and heel excessively dry and cracked.  (L) Heel/Foot/Toe Sole of foot excessively dry and cracked.     Devices In Places Tele Box, Blood Pressure Cuff and Pulse Ox, Left IJ PIV, SCDs      Interventions In Place Pillows and Low Air Loss Mattress    Possible Skin Injury No    Pictures Uploaded Into Epic N/A  Wound Consult Placed N/A  RN Wound Prevention Protocol Ordered Yes

## 2021-06-21 NOTE — CARE PLAN
The patient is Watcher - Medium risk of patient condition declining or worsening    Shift Goals  Clinical Goals: Remain free from injury, oxygenation and ventilation, prevent aspiration, normalization of labs and F & E.   Patient Goals: Sleep, go home  Family Goals: NA    Progress made toward(s) clinical / shift goals:    Problem: Pain - Standard  Goal: Alleviation of pain or a reduction in pain to the patient’s comfort goal  Outcome: Progressing       Problem: Knowledge Deficit - Standard  Goal: Patient and family/care givers will demonstrate understanding of plan of care, disease process/condition, diagnostic tests and medications  Outcome: Progressing       Patient is not progressing towards the following goals: NA

## 2021-06-21 NOTE — DISCHARGE INSTRUCTIONS
Discharge Instructions    Discharged to home by car with relative. Discharged via wheelchair, hospital escort: Yes.  Special equipment needed: Not Applicable    Be sure to schedule a follow-up appointment with your primary care doctor or any specialists as instructed.     Discharge Plan:   Diet Plan: Discussed  Activity Level: Discussed  Confirmed Follow up Appointment: Appointment Scheduled  Confirmed Symptoms Management: Discussed  Medication Reconciliation Updated: Yes    I understand that a diet low in cholesterol, fat, and sodium is recommended for good health. Unless I have been given specific instructions below for another diet, I accept this instruction as my diet prescription.   Other diet: regular    Special Instructions:   Sepsis, Diagnosis, Adult  Sepsis is a serious bodily reaction to an infection. The infection that triggers sepsis may be from a bacteria, virus, or fungus. Sepsis can result from an infection in any part of your body. Infections that commonly lead to sepsis include skin, lung, and urinary tract infections.  Sepsis is a medical emergency that must be treated right away in a hospital. In severe cases, it can lead to septic shock. Septic shock can weaken your heart and cause your blood pressure to drop. This can cause your central nervous system and your body's organs to stop working.  What are the causes?  This condition is caused by a severe reaction to infections from bacteria, viruses, or fungus. The germs that most often lead to sepsis include:  · Escherichia coli (E. coli) bacteria.  · Staphylococcus aureus (staph) bacteria.  · Some types of Streptococcus bacteria.  The most common infections affect these organs:  · The lung (pneumonia).  · The kidneys or bladder (urinary tract infection).  · The skin (cellulitis).  · The bowel, gallbladder, or pancreas.  What increases the risk?  You are more likely to develop this condition if:  · Your body's disease-fighting system (immune system)  is weakened.  · You are age 65 or older.  · You are male.  · You had surgery or you have been hospitalized.  · You have these devices inserted into your body:  ? A small, thin tube (catheter).  ? IV line.  ? Breathing tube.  ? Drainage tube.  · You are not getting enough nutrients from food (malnourished).  · You have a long-term (chronic) disease, such as cancer, lung disease, kidney disease, or diabetes.  · You are .  What are the signs or symptoms?  Symptoms of this condition may include:  · Fever.  · Chills or feeling very cold.  · Confusion or anxiety.  · Fatigue.  · Muscle aches.  · Shortness of breath.  · Nausea and vomiting.  · Urinating much less than usual.  · Fast heart rate (tachycardia).  · Rapid breathing (hyperventilation).  · Changes in skin color. Your skin may look blotchy, pale, or blue.  · Cool, clammy, or sweaty skin.  · Skin rash.  Other symptoms depend on the source of your infection.  How is this diagnosed?  This condition is diagnosed based on:  · Your symptoms.  · Your medical history.  · A physical exam.  Other tests may also be done to find out the cause of the infection and how severe the sepsis is. These tests may include:  · Blood tests.  · Urine tests.  · Swabs from other areas of your body that may have an infection. These samples may be tested (cultured) to find out what type of bacteria is causing the infection.  · Chest X-ray to check for pneumonia. Other imaging tests, such as a CT scan, may also be done.  · Lumbar puncture. This removes a small amount of the fluid that surrounds your brain and spinal cord. The fluid is then examined for infection.  How is this treated?  This condition must be treated in a hospital. Based on the cause of your infection, you may be given an antibiotic, antiviral, or antifungal medicine.  You may also receive:  · Fluids through an IV.  · Oxygen and breathing assistance.  · Medicines to increase your blood pressure.  · Kidney  dialysis. This process cleans your blood if your kidneys have failed.  · Surgery to remove infected tissue.  · Blood transfusion if needed.  · Medicine to prevent blood clots.  · Nutrients to correct imbalances in basic body function (metabolism). You may:  ? Receive important salts and minerals (electrolytes) through an IV.  ? Have your blood sugar level adjusted.  Follow these instructions at home:  Medicines    · Take over-the-counter and prescription medicines only as told by your health care provider.  · If you were prescribed an antibiotic, antiviral, or antifungal medicine, take it as told by your health care provider. Do not stop taking the medicine even if you start to feel better.  General instructions  · If you have a catheter or other indwelling device, ask to have it removed as soon as possible.  · Keep all follow-up visits as told by your health care provider. This is important.  Contact a health care provider if:  · You do not feel like you are getting better or regaining strength.  · You are having trouble coping with your recovery.  · You frequently feel tired.  · You feel worse or do not seem to get better after surgery.  · You think you may have an infection after surgery.  Get help right away if:  · You have any symptoms of sepsis.  · You have difficulty breathing.  · You have a rapid or skipping heartbeat.  · You become confused or disoriented.  · You have a high fever.  · Your skin becomes blotchy, pale, or blue.  · You have an infection that is getting worse or not getting better.  These symptoms may represent a serious problem that is an emergency. Do not wait to see if the symptoms will go away. Get medical help right away. Call your local emergency services (911 in the [place] Do not drive yourself to the hospital.  Summary  · Sepsis is a medical emergency that requires immediate treatment in a hospital.  · This condition is caused by a severe reaction to infections from bacteria, viruses,  or fungus.  · Based on the cause of your infection, you may be given an antibiotic, antiviral, or antifungal medicine.  · Treatment may also include IV fluids, breathing assistance, and kidney dialysis.  This information is not intended to replace advice given to you by your health care provider. Make sure you discuss any questions you have with your health care provider.  Document Released: 09/15/2004 Document Revised: 07/26/2019 Document Reviewed: 07/26/2019  [employer] Patient Education © 2020 [employer]      · Is patient discharged on Warfarin / Coumadin?   No     Depression / Suicide Risk    As you are discharged from this [facility] facility, it is important to learn how to keep safe from harming yourself.    Recognize the warning signs:  · Abrupt changes in personality, positive or negative- including increase in energy   · Giving away possessions  · Change in eating patterns- significant weight changes-  positive or negative  · Change in sleeping patterns- unable to sleep or sleeping all the time   · Unwillingness or inability to communicate  · Depression  · Unusual sadness, discouragement and loneliness  · Talk of wanting to die  · Neglect of personal appearance   · Rebelliousness- reckless behavior  · Withdrawal from people/activities they love  · Confusion- inability to concentrate     If you or a loved one observes any of these behaviors or has concerns about self-harm, here's what you can do:  · Talk about it- your feelings and reasons for harming yourself  · Remove any means that you might use to hurt yourself (examples: pills, rope, extension cords, firearm)  · Get professional help from the community (Mental Health, Substance Abuse, psychological counseling)  · Do not be alone:Call your Safe Contact- someone whom you trust who will be there for you.  · Call your local CRISIS HOTLINE 922-4470 or 196-446-0752  · Call your local Children's Mobile Crisis Response Team [place] (478) 004-5910  or www.[employer]  · Call the toll free National Suicide Prevention Hotlines   · National Suicide Prevention Lifeline 258-423-AQZO (3036)  · [facility] Hope Line Network 800-SUICIDE (853-5144)        Pyelonephritis, Adult    Pyelonephritis is an infection that occurs in the kidney. The kidneys are organs that help clean the blood by moving waste out of the blood and into the pee (urine). This infection can happen quickly, or it can last for a long time. In most cases, it clears up with treatment and does not cause other problems.  What are the causes?  This condition may be caused by:  · Germs (bacteria) going from the bladder up to the kidney. This may happen after having a bladder infection.  · Germs going from the blood to the kidney.  What increases the risk?  This condition is more likely to develop in:  · Pregnant women.  · Older people.  · People who have any of these conditions:  ? Diabetes.  ? Inflammation of the prostate gland (prostatitis), in males.  ? Kidney stones or bladder stones.  ? Other problems with the kidney or the parts of your body that carry pee from the kidneys to the bladder (ureters).  ? Cancer.  · People who have a small, thin tube (catheter) placed in the bladder.  · People who are sexually active.  · Women who use a medicine that kills sperm (spermicide) to prevent pregnancy.  · People who have had a prior urinary tract infection (UTI).  What are the signs or symptoms?  Symptoms of this condition include:  · Peeing often.  · A strong urge to pee right away.  · Burning or stinging when peeing.  · Belly pain.  · Back pain.  · Pain in the side (flank area).  · Fever or chills.  · Blood in the pee, or dark pee.  · Feeling sick to your stomach (nauseous) or throwing up (vomiting).  How is this treated?  This condition may be treated by:  · Taking antibiotic medicines by mouth (orally).  · Drinking enough fluids.  If the infection is bad, you may need to stay in the hospital. You may be  given antibiotics and fluids that are put directly into a vein through an IV tube.  In some cases, other treatments may be needed.  Follow these instructions at home:  Medicines  · Take your antibiotic medicine as told by your doctor. Do not stop taking the antibiotic even if you start to feel better.  · Take over-the-counter and prescription medicines only as told by your doctor.  General instructions    · Drink enough fluid to keep your pee pale yellow.  · Avoid caffeine, tea, and carbonated drinks.  · Pee (urinate) often. Avoid holding in pee for long periods of time.  · Pee before and after sex.  · After pooping (having a bowel movement), women should wipe from front to back. Use each tissue only once.  · Keep all follow-up visits as told by your doctor. This is important.  Contact a doctor if:  · You do not feel better after 2 days.  · Your symptoms get worse.  · You have a fever.  Get help right away if:  · You cannot take your medicine or drink fluids as told.  · You have chills and shaking.  · You throw up.  · You have very bad pain in your side or back.  · You feel very weak or you pass out (faint).  Summary  · Pyelonephritis is an infection that occurs in the kidney.  · In most cases, this infection clears up with treatment and does not cause other problems.  · Take your antibiotic medicine as told by your doctor. Do not stop taking the antibiotic even if you start to feel better.  · Drink enough fluid to keep your pee pale yellow.  This information is not intended to replace advice given to you by your health care provider. Make sure you discuss any questions you have with your health care provider.  Document Released: 01/25/2006 Document Revised: 10/22/2019 Document Reviewed: 10/22/2019  Elsevier Patient Education © 2020 [employer]      Finding Treatment for Addiction  Addiction is a complex disease of the brain that causes an uncontrollable (compulsive) need for:  · A substance. This includes  alcohol, illegal drugs, or prescription medicines, such as painkillers.  · An activity or behavior, such as gambling or shopping.  Addiction changes the way your brain works. Because of this change:  · The need for the medicine, drug, or activity can become so strong that you think about it all the time.  · Getting more and more of your addiction becomes the most important thing to you.  · You may find yourself leaving other activities and relationships to pursue your addiction.  · You can become physically dependent on a substance.  · Your health, behavior, emotions, and relationships can change for the worse.  How do I know if I need treatment for addiction?  Addiction is a progressive disease. Without treatment, addiction can get worse. Living with addiction puts you at higher risk for injury, poor health, loss of employment, loss of money, and even death.  You might need treatment for addiction if:  · You have tried to stop or cut down, but you have not succeeded.  · You find it annoying that your friends and family are concerned about your use or behavior.  · You feel guilty about your use or behavior.  · You need a particular substance or activity to start your day or to calm down.  · You are running out of money because of your addiction.  · You have done something illegal to support your addiction.  · Your addiction has caused you:  ? Health problems.  ? Trouble in school, work, home, or with the police.  ? To devote all your time to your addiction, and not to other responsibilities.  ? To tell lies in order to hide your problem.  What types of treatment are available?  There may be options for treatment programs and plans based on your addiction, condition, needs, and preferences. No single treatment is right for everyone.  · Treatment programs can be:  ? Outpatient. You live at home and go to work or school, but you go to a clinic for treatment.  ? Inpatient. You live and sleep at the program facility  during treatment.  · Programs may include:  ? Medicine. You may need medicine to treat the addiction itself, or to treat anxiety or depression.  ? Counseling and behavior therapy. This can help individuals and families behave in healthier ways and relate more effectively.  ? Support groups. Confidential group therapy, such as a 12-step program, can help individuals and families during treatment and recovery.  ? A combination of education, counseling, and a 12-step, spirituality-based approach.  What should I consider when selecting a treatment program?  Think about your individual requirements when selecting a treatment program. Ask about:  · The overall approach to treatment.  ? Some programs are strictly 12-step programs. Some have a more flexible approach.  ? Programs may differ in length of stay, setting, and size.  ? Some programs include your family in your treatment plan. Support may be offered to them throughout the treatment process, as well as instructions for them when you are discharged.  ? You may continue to receive support after you have left the program.  · The types of medical services that are offered. Find out if the program:  ? Offers specific treatment for your particular addiction.  ? Meets all of your needs, including physical and cultural needs.  ? Includes any medicines you might need.  ? Offers mental health counseling as part of your treatment.  ? Offers the 12-step meetings at the center, or if transport is available for patients to attend meetings at other locations.  · The cost and types of insurance that are accepted.  ? Some programs are sponsored by the government. They support patients who do not have private insurance.  ? If you do not have insurance, or if you choose to attend a program that does not accept your insurance, call the treatment center. Tell them your financial needs and whether a payment plan can be set up.  ? There are also organizations that will help you find the  "resources for treatment. You can find them online by searching \"treatment for addiction.\"  · If the program is certified by the appropriate government agency.  Where to find support  · Your health care provider can help you to find the right treatment. These discussions are confidential.  · The National [place] on Alcoholism and Drug Dependence (NCADD). This group has information about treatment centers and programs for people who have an addiction and for family members.  ? Call: 0-696-GFY-CALL (1-657.918.6668).  ? Visit the website: https://www.ncadd.org/  · The Substance Abuse and Mental Health Services Administration (SAMA). This organization will help you find publicly funded treatment centers, help hotlines, and counseling services near you.  ? Call: 0-066-918-HELP (1-698.825.4383).  ? Visit the website: www.findtreatment.sama.gov  · The National Problem Gambling Helpline. This is a 24-hour confidential helpline for gambling addiction.  ? Call: 1-744.608.4933  ? Visit the website: https://www.[facility]org/  In countries outside of the [place] and [place], look in local directories for contact information for services in your area.  Follow these instructions at home:  · Find supportive people who will help you stay away from your addiction and stay sober.  · Do not use the substance or engage in the activity.  · If you have been through treatment:  ? Follow your plan. The plan is usually developed by you and your health care provider during treatment.  ? Go to meetings with other people in recovery.  ? Avoid people, situations, and things that lead you to do the things you are addicted to (triggers).  Summary  · Addiction changes the way your brain works. These changes cause a desire to repeat and increase the use of the a substance or behavior.  · Addiction is a progressive disease. Without treatment, addiction can get worse. Living with addiction puts you at higher risk for injury, poor health, loss of " employment, loss of money, and even death.  · There may be options for treatment programs and plans based on your addiction, condition, needs, and preferences. No single treatment is right for everyone.  · Your health care provider can help you to find the right treatment. These discussions are confidential.  This information is not intended to replace advice given to you by your health care provider. Make sure you discuss any questions you have with your health care provider.  Document Released: 11/16/2006 Document Revised: 01/16/2019 Document Reviewed: 01/16/2019  Elsevier Patient Education © 2020 [employer]    Stimulant Use Disorder-Methamphetamines  Methamphetamines belong to a group of powerful drugs known as stimulants. Common street names for methamphetamine are meth, speed, crystal, ice, glass, and chalk. Methamphetamines have some medical uses, but they are often misused because of the effects that they produce. These effects include:  · A feeling of extreme pleasure (euphoria).  · Alertness.  · A high energy level.  · Increased sexuality.  Stimulant use disorder is when your stimulant use disrupts your daily life. It may disrupt your relationships and how you do your job. Stimulant use disorder can be dangerous. Methamphetamines increase your blood pressure and heart rate. Using them can lead to a heart attack or stroke. Methamphetamines can also make your heart rate irregular and cause seizures. These problems can lead to death.  What are the causes?  This condition is caused by misusing methamphetamines for a period of time, such as by taking them for reasons other than to treat a diagnosed problem. Many people start using methamphetamine because they make them feel good. Over time, they get addicted to them. When they try to stop using it, they feel sick.  Methamphetamines work fast, and the good feelings that they produce go away quickly. As a result, people often binge on the drug and take multiple  doses over short periods of time.  What increases the risk?  This condition is more likely to develop in people who:  · Misuse other drugs.  · Have problems with mood or behavior.  What are the signs or symptoms?  Symptoms of this condition include:  · Using greater amounts of a methamphetamine than you want to, or using a methamphetamine for longer than you want to.  · Trying several times to use less of a methamphetamine or to control your methamphetamine use.  · Craving methamphetamines.  · Spending a lot of time getting methamphetamines, using them, or recovering from their effects.  · Having problems at work, at school, at home, or in relationships because of methamphetamine use.  · Giving up or cutting down on important life activities because of methamphetamine use.  · Using methamphetamines when it is dangerous, such as when driving a car.  · Continuing to use methamphetamines even though they are causing or have led to a physical problem, such as:  ? Extreme weight loss.  ? Malnutrition.  ? Jaw clenching.  ? Severe dental problems.  ? Lung problems.  ? Skin sores.  ? An infection, such as hepatitis or HIV (human immunodeficiency virus).  · Continuing to use methamphetamines even though they are causing a mental problem, such as:  ? Memory problems.  ? Seeing or hearing things that are not really there (having hallucinations).  ? Violent behavior.  ? Anxiety.  ? Sleep problems.  · Needing more and more of a methamphetamine to get the same effect that you want (building up a tolerance).  · Having symptoms of withdrawal when you stop using a methamphetamine. Symptoms of withdrawal include:  ? Inability to feel pleasure (anhedonia).  ? Irritability.  ? Low energy.  ? Restlessness.  ? Bad dreams.  ? Too little or too much sleep.  ? Increased appetite.  How is this diagnosed?  This condition is diagnosed with an assessment. During the assessment, your health care provider will ask about your methamphetamine use  and about how it affects your life. You will be diagnosed with the condition if you have had at least two symptoms of this condition within a 12-month period. How severe the condition is depends on how many symptoms you have:  · If you have 2 or 3 symptoms, your condition is mild.  · If you have 4 or 5 symptoms, your condition is moderate.  · If you have 6 or more symptoms, your condition is severe.  Your health care provider may perform a physical exam or do lab tests to see if you have physical problems resulting from methamphetamine use. Your health care provider may also screen for drug use and refer you to a mental health professional for evaluation.  How is this treated?  Treatment for this condition may involve:  · Immediate care. This treatment addresses your symptoms and immediate needs. It helps prevent or minimize damage from any physical or mental problems that are related to your methamphetamine use.  · Medicines to treat related disorders.  · Long-term substance abuse treatment. This type helps you stop using methamphetamines. It is usually provided by mental health professionals with training in substance use disorders. It usually involves a combination of the following:  ? Counseling. This treatment is also called talk therapy. It is provided by substance use treatment counselors. A counselor can address the reasons you use methamphetamines and suggest ways to keep you from using methamphetamines again. The goals of talk therapy are to find healthy activities and ways to cope with stress, identify and avoid what triggers your methamphetamine use, and help you learn how to handle cravings.  ? Support groups. Support groups are run by people who have quit using stimulants. They provide emotional support, advice, and guidance.  Follow these instructions at home:    · Take over-the-counter and prescription medicines only as told by your health care provider.  · Check with your health care provider before  starting any new medicines.  · Keep all follow-up visits as told by your health care provider. This is important.  · Take care of your teeth by:  ? Brushing and flossing your teeth. Do this two times a day.  ? Using an antibacterial mouthwash.  ? Avoiding sugary drinks.  · Do not use any products that contain nicotine or tobacco, such as cigarettes and e-cigarettes. If you need help quitting, ask your health care provider  Where to find more information  · National [place] on Drug Abuse: www.drugabuse.gov  · Substance Abuse and [employer]: www.samhsa.gov  Contact a health care provider if:  · Your symptoms get worse.  · You use methamphetamines again.  · You are not able to take medicines as told.  Get help right away if:  · You have serious thoughts about hurting yourself or others.  · You have a seizure.  · You have chest pain.  · You have sudden weakness.  · You lose some of your vision.  · You lose some of your speech.  If you ever feel like you may hurt yourself or others, or have thoughts about taking your own life, get help right away. You can go to your nearest emergency department or call:  · Your local emergency services (911 in the [place]  · A suicide crisis helpline, such as the National Suicide Prevention Lifeline at 1-323.693.8862. This is open 24 hours a day.  This information is not intended to replace advice given to you by your health care provider. Make sure you discuss any questions you have with your health care provider.  Document Released: 08/23/2005 Document Revised: 11/30/2018 Document Reviewed: 09/29/2017  Elsevier Patient Education © 2020 [employer]    Pregnancy  If you are planning on getting pregnant, it is a good idea to make a preconception appointment with your caregiver to discuss having a healthy lifestyle before getting pregnant. This includes diet, weight, exercise, taking prenatal vitamins (especially folic acid, which helps prevent brain and  spinal cord defects), avoiding alcohol, smoking and illegal drugs, medical problems (diabetes, convulsions), family history of genetic problems, working conditions, and immunizations. It is better to have knowledge of these things and do something about them before getting pregnant.  During your pregnancy, it is important to follow certain guidelines in order to have a healthy baby. It is very important to get good prenatal care and follow your caregiver's instructions. Prenatal care includes all the medical care you receive before your baby's birth. This helps to prevent problems during the pregnancy and childbirth.  HOME CARE INSTRUCTIONS   · Start your prenatal visits by the 12th week of pregnancy or earlier, if possible. At first, appointments are usually scheduled monthly. They become more frequent in the last 2 months before delivery. It is important that you keep your caregiver's appointments and follow your caregiver's instructions regarding medication use, exercise, and diet.  · During pregnancy, you are providing food for you and your baby. Eat a regular, well-balanced diet. Choose foods such as meat, fish, milk and other dairy products, vegetables, fruits, whole-grain breads and cereals. Your caregiver will inform you of the ideal weight gain depending on your current height and weight. Drink lots of liquids. Try to drink 8 glasses of water a day.  · Alcohol is associated with a number of birth defects including fetal alcohol syndrome. It is best to avoid alcohol completely. Smoking will cause low birth rate and prematurity. Use of alcohol and nicotine during your pregnancy also increases the chances that your child will be chemically dependent later in their life and may contribute to SIDS (Sudden Infant Death Syndrome).  · Do not use illegal drugs.  · Only take prescription or over-the-counter medications that are recommended by your caregiver. Other medications can cause genetic and physical problems  in the baby.  · Morning sickness can often be helped by keeping soda crackers at the bedside. Eat a few before getting up in the morning.  · A sexual relationship may be continued until near the end of pregnancy if there are no other problems such as early (premature) leaking of amniotic fluid from the membranes, vaginal bleeding, painful intercourse or belly (abdominal) pain.  · Exercise regularly. Check with your caregiver if you are unsure of the safety of some of your exercises.  · Do not use hot tubs, steam rooms or saunas. These increase the risk of fainting and hurting yourself and the baby. Swimming is OK for exercise. Get plenty of rest, including afternoon naps when possible, especially in the third trimester.  · Avoid toxic odors and chemicals.  · Do not wear high heels. They may cause you to lose your balance and fall.  · Do not lift over 5 pounds. If you do lift anything, lift with your legs and thighs, not your back.  · Avoid long trips, especially in the third trimester.  · If you have to travel out of the city or state, take a copy of your medical records with you.  SEEK IMMEDIATE MEDICAL CARE IF:   · You develop an unexplained oral temperature above 102° F (38.9° C), or as your caregiver suggests.  · You have leaking of fluid from the vagina. If leaking membranes are suspected, take your temperature and inform your caregiver of this when you call.  · There is vaginal spotting or bleeding. Notify your caregiver of the amount and how many pads are used.  · You continue to feel sick to your stomach (nauseous) and have no relief from remedies suggested, or you throw up (vomit) blood or coffee ground like materials.  · You develop upper abdominal pain.  · You have round ligament discomfort in the lower abdominal area. This still must be evaluated by your caregiver.  · You feel contractions of the uterus.  · You do not feel the baby move, or there is less movement than before.  · You have painful  urination.  · You have abnormal vaginal discharge.  · You have persistent diarrhea.  · You get a severe headache.  · You have problems with your vision.  · You develop muscle weakness.  · You feel dizzy and faint.  · You develop shortness of breath.  · You develop chest pain.  · You have back pain that travels down to your leg and feet.  · You feel irregular or a very fast heartbeat.  · You develop excessive weight gain in a short period of time (5 pounds in 3 to 5 days).  · You are involved in a domestic violence situation.  Document Released: 12/18/2006 Document Revised: 06/18/2013 Document Reviewed: 06/11/2010  [employer]® Patient Information ©2014 [employer], [employer].

## 2021-06-22 NOTE — DISCHARGE SUMMARY
Discharge Summary    CHIEF COMPLAINT ON ADMISSION  Chief Complaint   Patient presents with   • Abdominal Pain     BIB EMS after LLQ pain x's 6 days. c/o weakness and lethargy. Reports possible 16 weeks pregnant. hx of miscarriage. States she has not had any prenatal care       Reason for Admission  ems     Admission Date  6/20/2021    CODE STATUS  Prior    HPI & HOSPITAL COURSE    As per dr mcdonald:   25 y.o. female who presented 6/20/2021 with past medical history of hypertension, methamphetamine abuse, recurrent pyelonephritis, current variable intrauterine pregnancy 14 weeks presented to the ER by EMS for chief complaint of left lower quadrant abdominal pain for 6 days.  She reports constant sharp pain to her left flank radiating to her left lower quadrant abdomen 8/10 severity worse with movement no relieving factors visited the ER 2 days ago but left without ERP evaluation came back today for unrelenting left lower quadrant pain.  Patient has been spending most of his time in the bed due to the pain.  It is also associated with fever and chills for about 1 week.  She denies any dysuria, hematuria, pyuria, shortness of breath, chest pain, cough, sore throat,  palpitations, nausea, vomiting, focal weakness skin rashes, diarrhea, vaginal bleeding/discharge.  She last smoked methamphetamine about 2 days ago and used IV meth about 9 days ago      During her hospital stay patient received IV fluids, IV antibiotics antiemetics.  She was given a p.o. diet which he tolerated well.  Patient had significant improvement with this management and was stable for discharge on 6/21 with p.o. antibiotics    Therefore, she is discharged in good and stable condition to home with close outpatient follow-up.    The patient recovered much more quickly than anticipated on admission.    Discharge Date  6/21/2021    FOLLOW UP ITEMS POST DISCHARGE      DISCHARGE DIAGNOSES  Active Problems:    14 weeks gestation of pregnancy POA: Yes     Methamphetamine abuse (HCC) POA: Yes  Resolved Problems:    Sepsis (HCC) POA: Yes    Acute pyelonephritis POA: Yes      FOLLOW UP  Future Appointments   Date Time Provider Department Center   6/25/2021  9:30 AM Josep Muir M.D. [facility]     primary care    Schedule an appointment as soon as possible for a visit in 1 week      Pcp Pt States None            MEDICATIONS ON DISCHARGE     Medication List      START taking these medications      Instructions   cefdinir 300 MG Caps  Commonly known as: OMNICEF   Take 1 capsule by mouth 2 times a day for 7 days.  Dose: 300 mg     ondansetron 4 MG Tabs tablet  Commonly known as: Zofran   Take 1 tablet by mouth every four hours as needed for Nausea/Vomiting for up to 4 days.  Dose: 4 mg        STOP taking these medications    lisinopril 10 MG Tabs  Commonly known as: PRINIVIL            Allergies  No Known Allergies    DIET  No orders of the defined types were placed in this encounter.      ACTIVITY  As tolerated.  Weight bearing as tolerated    CONSULTATIONS  none    PROCEDURES  none    LABORATORY  Lab Results   Component Value Date    SODIUM 138 06/21/2021    POTASSIUM 3.7 06/21/2021    CHLORIDE 105 06/21/2021    CO2 23 06/21/2021    GLUCOSE 91 06/21/2021    BUN 6 (L) 06/21/2021    CREATININE 0.57 06/21/2021        Lab Results   Component Value Date    WBC 11.3 (H) 06/21/2021    HEMOGLOBIN 11.8 (L) 06/21/2021    HEMATOCRIT 35.3 (L) 06/21/2021    PLATELETCT 216 06/21/2021        Total time of the discharge process exceeds 36  minutes.

## 2021-06-25 LAB
BACTERIA BLD CULT: NORMAL
SIGNIFICANT IND 70042: NORMAL
SITE SITE: NORMAL
SOURCE SOURCE: NORMAL

## 2025-01-08 ENCOUNTER — HOSPITAL ENCOUNTER (OUTPATIENT)
Dept: RADIOLOGY | Facility: MEDICAL CENTER | Age: 30
End: 2025-01-08
Attending: FAMILY MEDICINE
Payer: MEDICAID

## 2025-01-08 DIAGNOSIS — M54.50 LOW BACK PAIN, NON-SPECIFIC: ICD-10-CM

## 2025-01-08 PROCEDURE — 72100 X-RAY EXAM L-S SPINE 2/3 VWS: CPT
